# Patient Record
Sex: FEMALE | Race: WHITE | HISPANIC OR LATINO
[De-identification: names, ages, dates, MRNs, and addresses within clinical notes are randomized per-mention and may not be internally consistent; named-entity substitution may affect disease eponyms.]

---

## 2017-03-06 ENCOUNTER — RESULT REVIEW (OUTPATIENT)
Age: 42
End: 2017-03-06

## 2017-08-18 ENCOUNTER — RESULT REVIEW (OUTPATIENT)
Age: 42
End: 2017-08-18

## 2017-09-14 ENCOUNTER — APPOINTMENT (OUTPATIENT)
Dept: OBGYN | Facility: CLINIC | Age: 42
End: 2017-09-14
Payer: COMMERCIAL

## 2017-09-14 VITALS
DIASTOLIC BLOOD PRESSURE: 70 MMHG | TEMPERATURE: 98 F | WEIGHT: 205 LBS | BODY MASS INDEX: 36.32 KG/M2 | HEIGHT: 63 IN | SYSTOLIC BLOOD PRESSURE: 120 MMHG

## 2017-09-14 VITALS
SYSTOLIC BLOOD PRESSURE: 132 MMHG | TEMPERATURE: 98.6 F | WEIGHT: 175 LBS | BODY MASS INDEX: 31.01 KG/M2 | HEIGHT: 63 IN | RESPIRATION RATE: 16 BRPM | DIASTOLIC BLOOD PRESSURE: 82 MMHG

## 2017-09-14 DIAGNOSIS — N63 UNSPECIFIED LUMP IN BREAST: ICD-10-CM

## 2017-09-14 LAB
BILIRUB UR QL STRIP: NORMAL
BILIRUB UR QL STRIP: NORMAL
CLARITY UR: CLEAR
CLARITY UR: CLEAR
COLLECTION METHOD: NORMAL
COLLECTION METHOD: NORMAL
GLUCOSE UR-MCNC: NORMAL
GLUCOSE UR-MCNC: NORMAL
HCG UR QL: 0.2 EU/DL
HCG UR QL: 0.2 EU/DL
HGB UR QL STRIP.AUTO: NORMAL
HGB UR QL STRIP.AUTO: NORMAL
KETONES UR-MCNC: NORMAL
KETONES UR-MCNC: NORMAL
LEUKOCYTE ESTERASE UR QL STRIP: NORMAL
LEUKOCYTE ESTERASE UR QL STRIP: NORMAL
NITRITE UR QL STRIP: NORMAL
NITRITE UR QL STRIP: NORMAL
PH UR STRIP: 6.5
PH UR STRIP: 7
PROT UR STRIP-MCNC: NORMAL
PROT UR STRIP-MCNC: NORMAL
SP GR UR STRIP: 1.02
SP GR UR STRIP: 1.02

## 2017-09-14 PROCEDURE — 99396 PREV VISIT EST AGE 40-64: CPT

## 2017-09-14 PROCEDURE — 81003 URINALYSIS AUTO W/O SCOPE: CPT | Mod: QW

## 2017-09-18 ENCOUNTER — OTHER (OUTPATIENT)
Age: 42
End: 2017-09-18

## 2017-09-21 ENCOUNTER — RESULT REVIEW (OUTPATIENT)
Age: 42
End: 2017-09-21

## 2017-09-24 LAB — CYTOLOGY CVX/VAG DOC THIN PREP: NORMAL

## 2017-09-27 ENCOUNTER — APPOINTMENT (OUTPATIENT)
Dept: ULTRASOUND IMAGING | Facility: CLINIC | Age: 42
End: 2017-09-27
Payer: COMMERCIAL

## 2017-09-27 ENCOUNTER — APPOINTMENT (OUTPATIENT)
Dept: MAMMOGRAPHY | Facility: CLINIC | Age: 42
End: 2017-09-27
Payer: COMMERCIAL

## 2017-09-27 ENCOUNTER — OUTPATIENT (OUTPATIENT)
Dept: OUTPATIENT SERVICES | Facility: HOSPITAL | Age: 42
LOS: 1 days | End: 2017-09-27
Payer: COMMERCIAL

## 2017-09-27 DIAGNOSIS — Z00.8 ENCOUNTER FOR OTHER GENERAL EXAMINATION: ICD-10-CM

## 2017-09-27 PROCEDURE — G0204: CPT | Mod: 26

## 2017-09-27 PROCEDURE — G0279: CPT | Mod: 26

## 2017-09-27 PROCEDURE — 76642 ULTRASOUND BREAST LIMITED: CPT

## 2017-09-27 PROCEDURE — 76642 ULTRASOUND BREAST LIMITED: CPT | Mod: 26,50

## 2017-09-27 PROCEDURE — 77066 DX MAMMO INCL CAD BI: CPT

## 2017-09-27 PROCEDURE — G0279: CPT

## 2017-10-26 ENCOUNTER — APPOINTMENT (OUTPATIENT)
Dept: SURGERY | Facility: CLINIC | Age: 42
End: 2017-10-26
Payer: COMMERCIAL

## 2017-10-26 ENCOUNTER — LABORATORY RESULT (OUTPATIENT)
Age: 42
End: 2017-10-26

## 2017-10-26 PROCEDURE — 36415 COLL VENOUS BLD VENIPUNCTURE: CPT

## 2017-10-26 PROCEDURE — 99213 OFFICE O/P EST LOW 20 MIN: CPT | Mod: 25

## 2017-10-27 LAB
T3 SERPL-MCNC: 96 NG/DL
T4 FREE SERPL-MCNC: 1.7 NG/DL
THYROGLOB AB SERPL-ACNC: <20 IU/ML
THYROGLOB SERPL-MCNC: <0.2 NG/ML
TSH SERPL-ACNC: 0.05 UIU/ML

## 2017-11-02 ENCOUNTER — OTHER (OUTPATIENT)
Age: 42
End: 2017-11-02

## 2017-12-21 ENCOUNTER — APPOINTMENT (OUTPATIENT)
Dept: OBGYN | Facility: CLINIC | Age: 42
End: 2017-12-21

## 2018-05-02 ENCOUNTER — MEDICATION RENEWAL (OUTPATIENT)
Age: 43
End: 2018-05-02

## 2018-05-21 ENCOUNTER — APPOINTMENT (OUTPATIENT)
Dept: OBGYN | Facility: CLINIC | Age: 43
End: 2018-05-21
Payer: COMMERCIAL

## 2018-05-21 VITALS
BODY MASS INDEX: 31.71 KG/M2 | SYSTOLIC BLOOD PRESSURE: 128 MMHG | DIASTOLIC BLOOD PRESSURE: 78 MMHG | HEIGHT: 63 IN | TEMPERATURE: 98.7 F | RESPIRATION RATE: 16 BRPM | WEIGHT: 179 LBS

## 2018-05-21 DIAGNOSIS — B00.1 HERPESVIRAL VESICULAR DERMATITIS: ICD-10-CM

## 2018-05-21 PROCEDURE — 99214 OFFICE O/P EST MOD 30 MIN: CPT

## 2018-09-17 ENCOUNTER — APPOINTMENT (OUTPATIENT)
Dept: OBGYN | Facility: CLINIC | Age: 43
End: 2018-09-17
Payer: COMMERCIAL

## 2018-09-17 VITALS
WEIGHT: 178 LBS | TEMPERATURE: 98.8 F | OXYGEN SATURATION: 98 % | BODY MASS INDEX: 31.54 KG/M2 | HEART RATE: 80 BPM | HEIGHT: 63 IN | RESPIRATION RATE: 16 BRPM | DIASTOLIC BLOOD PRESSURE: 60 MMHG | SYSTOLIC BLOOD PRESSURE: 112 MMHG

## 2018-09-17 LAB — GLUCOSE BLDC GLUCOMTR-MCNC: 12.3

## 2018-09-17 PROCEDURE — 82270 OCCULT BLOOD FECES: CPT

## 2018-09-17 PROCEDURE — 99396 PREV VISIT EST AGE 40-64: CPT

## 2018-09-20 LAB — CYTOLOGY CVX/VAG DOC THIN PREP: NORMAL

## 2018-10-23 ENCOUNTER — LABORATORY RESULT (OUTPATIENT)
Age: 43
End: 2018-10-23

## 2018-10-23 ENCOUNTER — APPOINTMENT (OUTPATIENT)
Dept: SURGERY | Facility: CLINIC | Age: 43
End: 2018-10-23
Payer: COMMERCIAL

## 2018-10-23 PROCEDURE — 99213 OFFICE O/P EST LOW 20 MIN: CPT

## 2018-10-23 PROCEDURE — 36415 COLL VENOUS BLD VENIPUNCTURE: CPT

## 2018-10-24 LAB
T3 SERPL-MCNC: 84 NG/DL
T4 FREE SERPL-MCNC: 1.3 NG/DL
THYROGLOB AB SERPL-ACNC: <20 IU/ML
THYROGLOB SERPL-MCNC: <0.2 NG/ML
TSH SERPL-ACNC: 0.8 UIU/ML

## 2018-10-25 ENCOUNTER — RESULT REVIEW (OUTPATIENT)
Age: 43
End: 2018-10-25

## 2019-07-18 ENCOUNTER — APPOINTMENT (OUTPATIENT)
Dept: OBGYN | Facility: CLINIC | Age: 44
End: 2019-07-18

## 2019-08-30 ENCOUNTER — EMERGENCY (EMERGENCY)
Facility: HOSPITAL | Age: 44
LOS: 0 days | Discharge: HOME | End: 2019-08-31
Attending: FAMILY MEDICINE
Payer: COMMERCIAL

## 2019-08-30 VITALS
SYSTOLIC BLOOD PRESSURE: 154 MMHG | RESPIRATION RATE: 18 BRPM | HEART RATE: 100 BPM | WEIGHT: 169.98 LBS | HEIGHT: 62 IN | OXYGEN SATURATION: 96 % | DIASTOLIC BLOOD PRESSURE: 90 MMHG | TEMPERATURE: 102 F

## 2019-08-30 DIAGNOSIS — R73.03 PREDIABETES: ICD-10-CM

## 2019-08-30 DIAGNOSIS — N28.89 OTHER SPECIFIED DISORDERS OF KIDNEY AND URETER: ICD-10-CM

## 2019-08-30 DIAGNOSIS — R10.31 RIGHT LOWER QUADRANT PAIN: ICD-10-CM

## 2019-08-30 PROCEDURE — 83605 ASSAY OF LACTIC ACID: CPT

## 2019-08-30 PROCEDURE — 96375 TX/PRO/DX INJ NEW DRUG ADDON: CPT | Mod: 59

## 2019-08-30 PROCEDURE — 99284 EMERGENCY DEPT VISIT MOD MDM: CPT | Mod: 25

## 2019-08-30 PROCEDURE — 93005 ELECTROCARDIOGRAM TRACING: CPT

## 2019-08-30 PROCEDURE — 80053 COMPREHEN METABOLIC PANEL: CPT

## 2019-08-30 PROCEDURE — 81001 URINALYSIS AUTO W/SCOPE: CPT

## 2019-08-30 PROCEDURE — 85610 PROTHROMBIN TIME: CPT

## 2019-08-30 PROCEDURE — 87186 SC STD MICRODIL/AGAR DIL: CPT

## 2019-08-30 PROCEDURE — 85730 THROMBOPLASTIN TIME PARTIAL: CPT

## 2019-08-30 PROCEDURE — 99284 EMERGENCY DEPT VISIT MOD MDM: CPT

## 2019-08-30 PROCEDURE — 87040 BLOOD CULTURE FOR BACTERIA: CPT

## 2019-08-30 PROCEDURE — 36415 COLL VENOUS BLD VENIPUNCTURE: CPT

## 2019-08-30 PROCEDURE — 85025 COMPLETE CBC W/AUTO DIFF WBC: CPT

## 2019-08-30 PROCEDURE — 96365 THER/PROPH/DIAG IV INF INIT: CPT

## 2019-08-30 PROCEDURE — 74177 CT ABD & PELVIS W/CONTRAST: CPT

## 2019-08-30 PROCEDURE — 87086 URINE CULTURE/COLONY COUNT: CPT

## 2019-08-30 NOTE — ED ADULT NURSE NOTE - OBJECTIVE STATEMENT
Pt presented to ED c/o sharp intermittent right abd pain radiating to right flank, headache, neck pain, fever, chills. Pt took Tylenol PTA. Refuses rectal temp at this time. Denies chest pain, N/V/D, SOB, urinary symptoms at this time. Does not want pain medication at this time. Upon arrival sepsis protocol initiated. Maintained on tele monitor and spO2 monitoring. 12 kisha ekg done. Safety/fall precautions. Hx of endometriosis, thyroid cancer on Synthroid.

## 2019-08-31 ENCOUNTER — INPATIENT (INPATIENT)
Facility: HOSPITAL | Age: 44
LOS: 2 days | Discharge: HOME | DRG: 690 | End: 2019-09-03
Attending: INTERNAL MEDICINE | Admitting: FAMILY MEDICINE
Payer: COMMERCIAL

## 2019-08-31 VITALS
SYSTOLIC BLOOD PRESSURE: 150 MMHG | OXYGEN SATURATION: 95 % | DIASTOLIC BLOOD PRESSURE: 72 MMHG | HEART RATE: 70 BPM | RESPIRATION RATE: 18 BRPM

## 2019-08-31 VITALS — WEIGHT: 169.98 LBS | HEIGHT: 63 IN

## 2019-08-31 DIAGNOSIS — N12 TUBULO-INTERSTITIAL NEPHRITIS, NOT SPECIFIED AS ACUTE OR CHRONIC: ICD-10-CM

## 2019-08-31 LAB
ALBUMIN SERPL ELPH-MCNC: 3.7 G/DL — SIGNIFICANT CHANGE UP (ref 3.3–5)
ALBUMIN SERPL ELPH-MCNC: 4.2 G/DL — SIGNIFICANT CHANGE UP (ref 3.3–5)
ALP SERPL-CCNC: 58 U/L — SIGNIFICANT CHANGE UP (ref 40–120)
ALP SERPL-CCNC: 66 U/L — SIGNIFICANT CHANGE UP (ref 40–120)
ALT FLD-CCNC: 18 U/L — SIGNIFICANT CHANGE UP (ref 12–78)
ALT FLD-CCNC: 34 U/L — SIGNIFICANT CHANGE UP (ref 12–78)
ANION GAP SERPL CALC-SCNC: 8 MMOL/L — SIGNIFICANT CHANGE UP (ref 5–17)
ANION GAP SERPL CALC-SCNC: 8 MMOL/L — SIGNIFICANT CHANGE UP (ref 5–17)
APPEARANCE UR: CLEAR — SIGNIFICANT CHANGE UP
APPEARANCE UR: CLEAR — SIGNIFICANT CHANGE UP
APTT BLD: 29.8 SEC — SIGNIFICANT CHANGE UP (ref 27.5–36.3)
APTT BLD: 30 SEC — SIGNIFICANT CHANGE UP (ref 27.5–36.3)
AST SERPL-CCNC: 18 U/L — SIGNIFICANT CHANGE UP (ref 15–37)
AST SERPL-CCNC: 47 U/L — HIGH (ref 15–37)
BASOPHILS # BLD AUTO: 0.01 K/UL — SIGNIFICANT CHANGE UP (ref 0–0.2)
BASOPHILS # BLD AUTO: 0.01 K/UL — SIGNIFICANT CHANGE UP (ref 0–0.2)
BASOPHILS NFR BLD AUTO: 0.1 % — SIGNIFICANT CHANGE UP (ref 0–2)
BASOPHILS NFR BLD AUTO: 0.1 % — SIGNIFICANT CHANGE UP (ref 0–2)
BILIRUB SERPL-MCNC: 0.5 MG/DL — SIGNIFICANT CHANGE UP (ref 0.2–1.2)
BILIRUB SERPL-MCNC: 0.5 MG/DL — SIGNIFICANT CHANGE UP (ref 0.2–1.2)
BILIRUB UR-MCNC: NEGATIVE — SIGNIFICANT CHANGE UP
BILIRUB UR-MCNC: NEGATIVE — SIGNIFICANT CHANGE UP
BUN SERPL-MCNC: 13 MG/DL — SIGNIFICANT CHANGE UP (ref 7–23)
BUN SERPL-MCNC: 23 MG/DL — SIGNIFICANT CHANGE UP (ref 7–23)
CALCIUM SERPL-MCNC: 8.4 MG/DL — LOW (ref 8.5–10.1)
CALCIUM SERPL-MCNC: 9.3 MG/DL — SIGNIFICANT CHANGE UP (ref 8.5–10.1)
CHLORIDE SERPL-SCNC: 103 MMOL/L — SIGNIFICANT CHANGE UP (ref 96–108)
CHLORIDE SERPL-SCNC: 104 MMOL/L — SIGNIFICANT CHANGE UP (ref 96–108)
CO2 SERPL-SCNC: 26 MMOL/L — SIGNIFICANT CHANGE UP (ref 22–31)
CO2 SERPL-SCNC: 27 MMOL/L — SIGNIFICANT CHANGE UP (ref 22–31)
COLOR SPEC: YELLOW — SIGNIFICANT CHANGE UP
COLOR SPEC: YELLOW — SIGNIFICANT CHANGE UP
CREAT SERPL-MCNC: 0.93 MG/DL — SIGNIFICANT CHANGE UP (ref 0.5–1.3)
CREAT SERPL-MCNC: 0.98 MG/DL — SIGNIFICANT CHANGE UP (ref 0.5–1.3)
DIFF PNL FLD: ABNORMAL
DIFF PNL FLD: ABNORMAL
EOSINOPHIL # BLD AUTO: 0.01 K/UL — SIGNIFICANT CHANGE UP (ref 0–0.5)
EOSINOPHIL # BLD AUTO: 0.01 K/UL — SIGNIFICANT CHANGE UP (ref 0–0.5)
EOSINOPHIL NFR BLD AUTO: 0.1 % — SIGNIFICANT CHANGE UP (ref 0–6)
EOSINOPHIL NFR BLD AUTO: 0.1 % — SIGNIFICANT CHANGE UP (ref 0–6)
GLUCOSE SERPL-MCNC: 132 MG/DL — HIGH (ref 70–99)
GLUCOSE SERPL-MCNC: 145 MG/DL — HIGH (ref 70–99)
GLUCOSE UR QL: NEGATIVE MG/DL — SIGNIFICANT CHANGE UP
GLUCOSE UR QL: NEGATIVE MG/DL — SIGNIFICANT CHANGE UP
HCT VFR BLD CALC: 29.4 % — LOW (ref 34.5–45)
HCT VFR BLD CALC: 31.7 % — LOW (ref 34.5–45)
HGB BLD-MCNC: 10.4 G/DL — LOW (ref 11.5–15.5)
HGB BLD-MCNC: 9.7 G/DL — LOW (ref 11.5–15.5)
IMM GRANULOCYTES NFR BLD AUTO: 0.4 % — SIGNIFICANT CHANGE UP (ref 0–1.5)
IMM GRANULOCYTES NFR BLD AUTO: 0.5 % — SIGNIFICANT CHANGE UP (ref 0–1.5)
INR BLD: 1.03 RATIO — SIGNIFICANT CHANGE UP (ref 0.88–1.16)
INR BLD: 1.09 RATIO — SIGNIFICANT CHANGE UP (ref 0.88–1.16)
KETONES UR-MCNC: NEGATIVE — SIGNIFICANT CHANGE UP
KETONES UR-MCNC: NEGATIVE — SIGNIFICANT CHANGE UP
LACTATE SERPL-SCNC: 0.6 MMOL/L — LOW (ref 0.7–2)
LACTATE SERPL-SCNC: 0.7 MMOL/L — SIGNIFICANT CHANGE UP (ref 0.7–2)
LEUKOCYTE ESTERASE UR-ACNC: ABNORMAL
LEUKOCYTE ESTERASE UR-ACNC: NEGATIVE — SIGNIFICANT CHANGE UP
LYMPHOCYTES # BLD AUTO: 1.41 K/UL — SIGNIFICANT CHANGE UP (ref 1–3.3)
LYMPHOCYTES # BLD AUTO: 1.51 K/UL — SIGNIFICANT CHANGE UP (ref 1–3.3)
LYMPHOCYTES # BLD AUTO: 13 % — SIGNIFICANT CHANGE UP (ref 13–44)
LYMPHOCYTES # BLD AUTO: 13.1 % — SIGNIFICANT CHANGE UP (ref 13–44)
MCHC RBC-ENTMCNC: 29.1 PG — SIGNIFICANT CHANGE UP (ref 27–34)
MCHC RBC-ENTMCNC: 29.6 PG — SIGNIFICANT CHANGE UP (ref 27–34)
MCHC RBC-ENTMCNC: 32.8 GM/DL — SIGNIFICANT CHANGE UP (ref 32–36)
MCHC RBC-ENTMCNC: 33 GM/DL — SIGNIFICANT CHANGE UP (ref 32–36)
MCV RBC AUTO: 88.8 FL — SIGNIFICANT CHANGE UP (ref 80–100)
MCV RBC AUTO: 89.6 FL — SIGNIFICANT CHANGE UP (ref 80–100)
MONOCYTES # BLD AUTO: 1.05 K/UL — HIGH (ref 0–0.9)
MONOCYTES # BLD AUTO: 1.16 K/UL — HIGH (ref 0–0.9)
MONOCYTES NFR BLD AUTO: 10 % — SIGNIFICANT CHANGE UP (ref 2–14)
MONOCYTES NFR BLD AUTO: 9.7 % — SIGNIFICANT CHANGE UP (ref 2–14)
NEUTROPHILS # BLD AUTO: 8.27 K/UL — HIGH (ref 1.8–7.4)
NEUTROPHILS # BLD AUTO: 8.9 K/UL — HIGH (ref 1.8–7.4)
NEUTROPHILS NFR BLD AUTO: 76.4 % — SIGNIFICANT CHANGE UP (ref 43–77)
NEUTROPHILS NFR BLD AUTO: 76.5 % — SIGNIFICANT CHANGE UP (ref 43–77)
NITRITE UR-MCNC: NEGATIVE — SIGNIFICANT CHANGE UP
NITRITE UR-MCNC: POSITIVE
PH UR: 5 — SIGNIFICANT CHANGE UP (ref 5–8)
PH UR: 5 — SIGNIFICANT CHANGE UP (ref 5–8)
PLATELET # BLD AUTO: 212 K/UL — SIGNIFICANT CHANGE UP (ref 150–400)
PLATELET # BLD AUTO: 213 K/UL — SIGNIFICANT CHANGE UP (ref 150–400)
POTASSIUM SERPL-MCNC: 3.4 MMOL/L — LOW (ref 3.5–5.3)
POTASSIUM SERPL-MCNC: 3.4 MMOL/L — LOW (ref 3.5–5.3)
POTASSIUM SERPL-SCNC: 3.4 MMOL/L — LOW (ref 3.5–5.3)
POTASSIUM SERPL-SCNC: 3.4 MMOL/L — LOW (ref 3.5–5.3)
PROT SERPL-MCNC: 7.4 GM/DL — SIGNIFICANT CHANGE UP (ref 6–8.3)
PROT SERPL-MCNC: 7.8 GM/DL — SIGNIFICANT CHANGE UP (ref 6–8.3)
PROT UR-MCNC: 30 MG/DL
PROT UR-MCNC: NEGATIVE MG/DL — SIGNIFICANT CHANGE UP
PROTHROM AB SERPL-ACNC: 11.5 SEC — SIGNIFICANT CHANGE UP (ref 10–12.9)
PROTHROM AB SERPL-ACNC: 12.1 SEC — SIGNIFICANT CHANGE UP (ref 10–12.9)
RBC # BLD: 3.28 M/UL — LOW (ref 3.8–5.2)
RBC # BLD: 3.57 M/UL — LOW (ref 3.8–5.2)
RBC # FLD: 12.2 % — SIGNIFICANT CHANGE UP (ref 10.3–14.5)
RBC # FLD: 12.2 % — SIGNIFICANT CHANGE UP (ref 10.3–14.5)
SODIUM SERPL-SCNC: 137 MMOL/L — SIGNIFICANT CHANGE UP (ref 135–145)
SODIUM SERPL-SCNC: 139 MMOL/L — SIGNIFICANT CHANGE UP (ref 135–145)
SP GR SPEC: 1 — LOW (ref 1.01–1.02)
SP GR SPEC: 1.01 — SIGNIFICANT CHANGE UP (ref 1.01–1.02)
UROBILINOGEN FLD QL: NEGATIVE MG/DL — SIGNIFICANT CHANGE UP
UROBILINOGEN FLD QL: NEGATIVE MG/DL — SIGNIFICANT CHANGE UP
WBC # BLD: 10.8 K/UL — HIGH (ref 3.8–10.5)
WBC # BLD: 11.64 K/UL — HIGH (ref 3.8–10.5)
WBC # FLD AUTO: 10.8 K/UL — HIGH (ref 3.8–10.5)
WBC # FLD AUTO: 11.64 K/UL — HIGH (ref 3.8–10.5)

## 2019-08-31 PROCEDURE — 71045 X-RAY EXAM CHEST 1 VIEW: CPT | Mod: 26

## 2019-08-31 PROCEDURE — 74177 CT ABD & PELVIS W/CONTRAST: CPT | Mod: 26

## 2019-08-31 PROCEDURE — 80048 BASIC METABOLIC PNL TOTAL CA: CPT

## 2019-08-31 PROCEDURE — 93010 ELECTROCARDIOGRAM REPORT: CPT

## 2019-08-31 PROCEDURE — 71045 X-RAY EXAM CHEST 1 VIEW: CPT

## 2019-08-31 PROCEDURE — 36415 COLL VENOUS BLD VENIPUNCTURE: CPT

## 2019-08-31 PROCEDURE — 85025 COMPLETE CBC W/AUTO DIFF WBC: CPT

## 2019-08-31 RX ORDER — ACETAMINOPHEN 500 MG
650 TABLET ORAL ONCE
Refills: 0 | Status: COMPLETED | OUTPATIENT
Start: 2019-08-31 | End: 2019-08-31

## 2019-08-31 RX ORDER — SODIUM CHLORIDE 9 MG/ML
2500 INJECTION INTRAMUSCULAR; INTRAVENOUS; SUBCUTANEOUS ONCE
Refills: 0 | Status: COMPLETED | OUTPATIENT
Start: 2019-08-31 | End: 2019-08-31

## 2019-08-31 RX ORDER — KETOROLAC TROMETHAMINE 30 MG/ML
30 SYRINGE (ML) INJECTION ONCE
Refills: 0 | Status: DISCONTINUED | OUTPATIENT
Start: 2019-08-31 | End: 2019-08-31

## 2019-08-31 RX ORDER — PIPERACILLIN AND TAZOBACTAM 4; .5 G/20ML; G/20ML
3.38 INJECTION, POWDER, LYOPHILIZED, FOR SOLUTION INTRAVENOUS ONCE
Refills: 0 | Status: COMPLETED | OUTPATIENT
Start: 2019-08-31 | End: 2019-08-31

## 2019-08-31 RX ORDER — LEVOTHYROXINE SODIUM 125 MCG
100 TABLET ORAL DAILY
Refills: 0 | Status: DISCONTINUED | OUTPATIENT
Start: 2019-08-31 | End: 2019-09-03

## 2019-08-31 RX ORDER — ONDANSETRON 8 MG/1
4 TABLET, FILM COATED ORAL EVERY 6 HOURS
Refills: 0 | Status: DISCONTINUED | OUTPATIENT
Start: 2019-08-31 | End: 2019-09-01

## 2019-08-31 RX ORDER — ONDANSETRON 8 MG/1
4 TABLET, FILM COATED ORAL ONCE
Refills: 0 | Status: COMPLETED | OUTPATIENT
Start: 2019-08-31 | End: 2019-08-31

## 2019-08-31 RX ORDER — ACETAMINOPHEN 500 MG
500 TABLET ORAL ONCE
Refills: 0 | Status: COMPLETED | OUTPATIENT
Start: 2019-08-31 | End: 2019-08-31

## 2019-08-31 RX ORDER — CEFTRIAXONE 500 MG/1
1000 INJECTION, POWDER, FOR SOLUTION INTRAMUSCULAR; INTRAVENOUS EVERY 24 HOURS
Refills: 0 | Status: DISCONTINUED | OUTPATIENT
Start: 2019-08-31 | End: 2019-08-31

## 2019-08-31 RX ORDER — CEPHALEXIN 500 MG
1 CAPSULE ORAL
Qty: 42 | Refills: 0
Start: 2019-08-31 | End: 2019-09-13

## 2019-08-31 RX ORDER — SODIUM CHLORIDE 9 MG/ML
2400 INJECTION, SOLUTION INTRAVENOUS ONCE
Refills: 0 | Status: COMPLETED | OUTPATIENT
Start: 2019-08-31 | End: 2019-08-31

## 2019-08-31 RX ORDER — DEXTROSE MONOHYDRATE, SODIUM CHLORIDE, AND POTASSIUM CHLORIDE 50; .745; 4.5 G/1000ML; G/1000ML; G/1000ML
1000 INJECTION, SOLUTION INTRAVENOUS
Refills: 0 | Status: DISCONTINUED | OUTPATIENT
Start: 2019-08-31 | End: 2019-09-02

## 2019-08-31 RX ORDER — CEFTRIAXONE 500 MG/1
1000 INJECTION, POWDER, FOR SOLUTION INTRAMUSCULAR; INTRAVENOUS ONCE
Refills: 0 | Status: COMPLETED | OUTPATIENT
Start: 2019-08-31 | End: 2019-08-31

## 2019-08-31 RX ORDER — ACETAMINOPHEN 500 MG
975 TABLET ORAL ONCE
Refills: 0 | Status: COMPLETED | OUTPATIENT
Start: 2019-08-31 | End: 2019-08-31

## 2019-08-31 RX ORDER — CEFTRIAXONE 500 MG/1
1000 INJECTION, POWDER, FOR SOLUTION INTRAMUSCULAR; INTRAVENOUS EVERY 24 HOURS
Refills: 0 | Status: COMPLETED | OUTPATIENT
Start: 2019-08-31 | End: 2019-09-02

## 2019-08-31 RX ADMIN — Medication 500 MILLIGRAM(S): at 01:45

## 2019-08-31 RX ADMIN — Medication 30 MILLIGRAM(S): at 01:48

## 2019-08-31 RX ADMIN — Medication 500 MILLIGRAM(S): at 00:41

## 2019-08-31 RX ADMIN — SODIUM CHLORIDE 2400 MILLILITER(S): 9 INJECTION, SOLUTION INTRAVENOUS at 00:34

## 2019-08-31 RX ADMIN — DEXTROSE MONOHYDRATE, SODIUM CHLORIDE, AND POTASSIUM CHLORIDE 100 MILLILITER(S): 50; .745; 4.5 INJECTION, SOLUTION INTRAVENOUS at 22:39

## 2019-08-31 RX ADMIN — Medication 650 MILLIGRAM(S): at 23:10

## 2019-08-31 RX ADMIN — ONDANSETRON 4 MILLIGRAM(S): 8 TABLET, FILM COATED ORAL at 19:07

## 2019-08-31 RX ADMIN — Medication 650 MILLIGRAM(S): at 19:07

## 2019-08-31 RX ADMIN — CEFTRIAXONE 1000 MILLIGRAM(S): 500 INJECTION, POWDER, FOR SOLUTION INTRAMUSCULAR; INTRAVENOUS at 19:08

## 2019-08-31 RX ADMIN — Medication 30 MILLIGRAM(S): at 02:03

## 2019-08-31 RX ADMIN — Medication 650 MILLIGRAM(S): at 22:40

## 2019-08-31 RX ADMIN — SODIUM CHLORIDE 2500 MILLILITER(S): 9 INJECTION INTRAMUSCULAR; INTRAVENOUS; SUBCUTANEOUS at 20:08

## 2019-08-31 RX ADMIN — PIPERACILLIN AND TAZOBACTAM 200 GRAM(S): 4; .5 INJECTION, POWDER, LYOPHILIZED, FOR SOLUTION INTRAVENOUS at 00:34

## 2019-08-31 RX ADMIN — PIPERACILLIN AND TAZOBACTAM 3.38 GRAM(S): 4; .5 INJECTION, POWDER, LYOPHILIZED, FOR SOLUTION INTRAVENOUS at 01:04

## 2019-08-31 RX ADMIN — SODIUM CHLORIDE 2400 MILLILITER(S): 9 INJECTION, SOLUTION INTRAVENOUS at 01:34

## 2019-08-31 RX ADMIN — SODIUM CHLORIDE 833.33 MILLILITER(S): 9 INJECTION INTRAMUSCULAR; INTRAVENOUS; SUBCUTANEOUS at 19:07

## 2019-08-31 NOTE — ED PROVIDER NOTE - OBJECTIVE STATEMENT
Pt is a 43 yo wf with hx htn, prediabetes who noted stabbing, poking right flank pain radiating anteriorly since today. Had nausea no vomiting. Pt developed fever to 102 at home. No dysuria. No diarrhea. No recent travel. Nonsmoker nondrinker no drugs. Took tylenol prior to arrival. Has minimal pain at present . Pain is intermittent.

## 2019-08-31 NOTE — ED PROVIDER NOTE - CLINICAL SUMMARY MEDICAL DECISION MAKING FREE TEXT BOX
Pt with hx ht, prediabetes, here with abd pain to back and fever. Labs, ct u/a pain meds/antipyretic.

## 2019-08-31 NOTE — ED STATDOCS - CLINICAL SUMMARY MEDICAL DECISION MAKING FREE TEXT BOX
Pt returned for continued symptoms related to pyelonephritis.  IV fluids given, IV antibiotics, admit to medicine.  Discussed with Dr. Brown.

## 2019-08-31 NOTE — H&P ADULT - HISTORY OF PRESENT ILLNESS
HPI: The patient is a 43 yo female who came to the ER for LLQ abdominal pain, vomiting, fever 102. CT abd: Mild right perinephric fluid and right urothelial enhancement without obstructing stone. Findings may be related to recent passage of a stone.She was d/c home on Keflex.  After she took 2 doses of keflex she developed fever, chills, nausea, RLQ pain and returned to the ED.     PMHx: Thyroid CA s/p surgery, HYpothyroidism, HTN, endometriosis, heart murmur due to rheumatic fever,     PSHx: Thyroidectomy, Partial hysterectomy , oophorectomy, umbilical hernia repair.     Family Hx: Mother has Hx. of DM, HTN, father has no medical problesm.     Social Hx.: not smoking, no alcohol use    ROS: as in HPI.   Eyes: no changes in vision    ENT/Mouth: no changes    Cardiovascular: no chest pain    Respiratory: no SOB    Gastrointestinal: nausea,   Genitourinary: RLQ pain,     Breast: no pain    Musculoskeletal: no pain    Integumentary: no itching    Neurological: No Headache, no tremor,    Psychiatric: no suicidal ideations    Endocrine: no excessive thirst,     Hematologic/Lymphatic: no swollen glands    Allergic/Immunologic: no itching      Physical Exam: Vital Signs Last 24 Hrs  T(C): 37.8 (31 Aug 2019 17:42), Max: 38.7 (30 Aug 2019 23:47)  T(F): 100.1 (31 Aug 2019 17:42), Max: 101.6 (30 Aug 2019 23:47)  HR: 91 (31 Aug 2019 17:42) (70 - 100)  BP: 144/75 (31 Aug 2019 17:42) (144/75 - 158/88)  BP(mean): --  RR: 16 (31 Aug 2019 17:42) (16 - 18)  SpO2: 99% (31 Aug 2019 17:42) (95% - 99%)        HEENT: PRRL EOMI    MOUTH/TEETH/GUMS: Clear    NECK: no JVD    LUNGS: Clear    HEART: S1,S2 RR    ABDOMEN: soft nontender    EXTREMITIES:  no pedal edema    MUSCULOSKELETAL: no joint swelling     NEURO: no tremor, no focal signs.    SKIN: no rash    : CVA negative,     Lab:                       9.7    10.80 )-----------( 213      ( 31 Aug 2019 18:47 )             29.4       08-31    139  |  104  |  13  ----------------------------<  132<H>  3.4<L>   |  27  |  0.98    Ca    8.4<L>      31 Aug 2019 18:47    TPro  7.4  /  Alb  3.7  /  TBili  0.5  /  DBili  x   /  AST  47<H>  /  ALT  34  /  AlkPhos  66  08-31    Mild right perinephric fluid and right urothelial enhancement without   obstructing stone. Findings may be related to recent passage of a stone.   Correlate clinically for right ureteritis. No hydronephrosis.  Normal appendix. HPI: The patient is a 43 yo female who came to the ER for LLQ abdominal pain, vomiting, fever 102. CT abd: Mild right perinephric fluid and right urothelial enhancement without obstructing stone. Findings may be related to recent passage of a stone.She was d/c home on Keflex.  After she took 2 doses of keflex she developed fever, chills, nausea, RLQ pain and returned to the ED.     PMHx: Thyroid CA s/p surgery, HYpothyroidism, HTN, endometriosis, heart murmur due to rheumatic fever,     PSHx: Thyroidectomy, Partial hysterectomy , oophorectomy, umbilical hernia repair.     Family Hx: Mother has Hx. of DM, HTN, father has no medical problesm.     Social Hx.: not smoking, no alcohol use    ROS: as in HPI.   Eyes: no changes in vision    ENT/Mouth: no changes    Cardiovascular: no chest pain    Respiratory: no SOB    Gastrointestinal: nausea,   Genitourinary: RLQ pain,     Breast: no pain    Musculoskeletal: no pain    Integumentary: no itching    Neurological: No Headache, no tremor,    Psychiatric: no suicidal ideations    Endocrine: no excessive thirst,     Hematologic/Lymphatic: no swollen glands    Allergic/Immunologic: no itching      Physical Exam: Vital Signs Last 24 Hrs  T(C): 37.8 (31 Aug 2019 17:42), Max: 38.7 (30 Aug 2019 23:47)  T(F): 100.1 (31 Aug 2019 17:42), Max: 101.6 (30 Aug 2019 23:47)  HR: 91 (31 Aug 2019 17:42) (70 - 100)  BP: 144/75 (31 Aug 2019 17:42) (144/75 - 158/88)  BP(mean): --  RR: 16 (31 Aug 2019 17:42) (16 - 18)  SpO2: 99% (31 Aug 2019 17:42) (95% - 99%)        HEENT: PRRL EOMI    MOUTH/TEETH/GUMS: Clear    NECK: no JVD    LUNGS: Clear    HEART: S1,S2 RR    ABDOMEN: mild RLQ tenderness, no rebound.     EXTREMITIES:  no pedal edema    MUSCULOSKELETAL: no joint swelling     NEURO: no tremor, no focal signs.    SKIN: no rash    : CVA negative,     Lab:                       9.7    10.80 )-----------( 213      ( 31 Aug 2019 18:47 )             29.4       08-31    139  |  104  |  13  ----------------------------<  132<H>  3.4<L>   |  27  |  0.98    Ca    8.4<L>      31 Aug 2019 18:47    TPro  7.4  /  Alb  3.7  /  TBili  0.5  /  DBili  x   /  AST  47<H>  /  ALT  34  /  AlkPhos  66  08-31    Mild right perinephric fluid and right urothelial enhancement without   obstructing stone. Findings may be related to recent passage of a stone.   Correlate clinically for right ureteritis. No hydronephrosis.  Normal appendix.

## 2019-08-31 NOTE — H&P ADULT - ASSESSMENT
45 yo female who came to the ER for LLQ abdominal pain, vomiting, fever 102. CT abd: Mild right perinephric fluid and right urothelial enhancement without obstructing stone. Findings may be related to recent passage of a stone.She was d/c home on Keflex.  After she took 2 doses of keflex she developed fever, chills, nausea, RLQ pain and returned to the ED.   assessment Dx:                       1. UTI                       2. Uretritis                        3.Nausea/ vomiting                       4. Hypothyroidism                       5. HTN                        6. Hypokalemia.     Plan:    admit to : medicine                medications IV Ceftriaxone, IVF, Repeat K                VTEP: venodyne                Labs: cbc, cmp               Radiology: CT abd.               DNR status : full code.               Consults: ID

## 2019-08-31 NOTE — ED STATDOCS - OBJECTIVE STATEMENT
45 y/o female with a PMHx of heart murmur, hypothyroidism, HTN, endometriosis, s/p umbilical hernia repair, D&C presents to the ED c/o fever, nausea and abd pain with known UTI and right uteritis despite taking PO Keflex. Pt notes she has taken 2 doses of Keflex. Currently c/o recurrent fever, intractable nausea and abd pain. PCP: Ospiva. 43 y/o female with a PMHx of heart murmur, hypothyroidism, HTN, endometriosis, s/p umbilical hernia repair presents to the ED c/o fever, nausea and abd pain with known UTI and right uteritis following evaluation at [facility] yesterday. Pt notes she has taken 2 doses of Keflex. Currently c/o recurrent fever, intractable nausea and abd pain. PCP: Cristy. 45 y/o female with a PMHx of heart murmur, hypothyroidism, HTN, endometriosis, s/p umbilical hernia repair presents to the ED c/o fever, nausea and abd pain with known UTI and right uteritis despite taking PO Keflex since evaluation at [facility] yesterday. Pt notes she has taken 2 doses of Keflex. Currently c/o recurrent fever, intractable nausea and abd pain. PCP: Osmiltonva. 45 y/o female with a PMHx of anemia, heart murmur due to rheumatic fever, malignant neoplasm of thyroid gland, hypothyroidism, HTN, endometriosis s/p D&C, s/p right oophorectomy, s/p umbilical hernia repair presents to the ED c/o fever, nausea and abd pain with known UTI and right uteritis despite taking PO Keflex since evaluation at [facility] yesterday. Pt notes she has taken 2 doses of Keflex. Currently c/o recurrent fever, intractable nausea and abd pain. PCP: Cristy.

## 2019-08-31 NOTE — ED PROVIDER NOTE - PATIENT PORTAL LINK FT
You can access the FollowMyHealth Patient Portal offered by [facility] by registering at the following website: [email or web address]. By joining [employer]’s FollowMyHealth portal, you will also be able to view your health information using other applications (apps) compatible with our system.

## 2019-08-31 NOTE — ED PROVIDER NOTE - PMH
Anemia    Endometriosis, uterus    Heart murmur  due to rheumatic fever  History of hypothyroidism    HTN - Hypertension  was on Diovan for 2 months  Malignant neoplasm of thyroid gland

## 2019-08-31 NOTE — ED ADULT NURSE NOTE - OBJECTIVE STATEMENT
URINARY INFECTION CURRENTLY ON ABX BUT STILL HAS FEVER. max temp 102, took 400mgs Motrin 1hr PTA  Complaints of lower abdomen pain, nausea. discharged home with keflex, took 2 doses and still not feeling better

## 2019-08-31 NOTE — ED PROVIDER NOTE - NSFOLLOWUPINSTRUCTIONS_ED_ALL_ED_FT
Take Keflex 500mg three times daily. Drink lots of fluids. Take motrin or tylenol for discomfort. Follow up withyour urologist or Dr. Calvert. Return to ER if worse.

## 2019-08-31 NOTE — ED PROVIDER NOTE - CARE PROVIDER_API CALL
Shahzad Calvert)  Urology  [address] [facility] [address] [facility] [address], [address] [place] [address]  Phone: [phone]  Fax: [phone]  Follow Up Time:

## 2019-08-31 NOTE — ED STATDOCS - PSH
History of dilatation and curettage  for endometrosis    History of umbilical hernia repair  pt states hernia has retured  R Kidney Stent Placed then removed  due to kidney infection as per pt  S/P right oophorectomy  May 2011 (due to endometriosis)

## 2019-08-31 NOTE — ED STATDOCS - PROGRESS NOTE DETAILS
43 y/o female with a PMHx of heart murmur, hypothyroidism, HTN, endometriosis, s/p umbilical hernia repair, D&C presents to the ED c/o fever, nausea and abd pain with known UTI and right uteritis despite taking PO Keflex. Pt notes she has taken 2 doses of Keflex. Currently c/o recurrent fever, intractable nausea and abd pain. PCP: Cristy.   Karma Farrell PA-C To be admitted for fever, pyelonephritis.  Karma Farrell PA-C

## 2019-08-31 NOTE — ED PROVIDER NOTE - PROGRESS NOTE DETAILS
43 y/o F presents with abd pain x1 day. Pt reports intermittent R flank pain associated with n/v today. Reports Tmax of 102 at home. Denies CP, SOB, urinary sx, diarrhea or other complaints at this time.   Abd: soft, ND, NTTP. +RCVAT. no r/g/r. -Cornelio Lake PA-C Results given to patient and spouse.She understands need for f/u and when to rter if worse.  Lonny FLETCHER

## 2019-09-01 LAB
ANION GAP SERPL CALC-SCNC: 9 MMOL/L — SIGNIFICANT CHANGE UP (ref 5–17)
BASOPHILS # BLD AUTO: 0.01 K/UL — SIGNIFICANT CHANGE UP (ref 0–0.2)
BASOPHILS NFR BLD AUTO: 0.1 % — SIGNIFICANT CHANGE UP (ref 0–2)
BUN SERPL-MCNC: 11 MG/DL — SIGNIFICANT CHANGE UP (ref 7–23)
CALCIUM SERPL-MCNC: 8 MG/DL — LOW (ref 8.5–10.1)
CHLORIDE SERPL-SCNC: 110 MMOL/L — HIGH (ref 96–108)
CO2 SERPL-SCNC: 24 MMOL/L — SIGNIFICANT CHANGE UP (ref 22–31)
CREAT SERPL-MCNC: 0.79 MG/DL — SIGNIFICANT CHANGE UP (ref 0.5–1.3)
CULTURE RESULTS: NO GROWTH — SIGNIFICANT CHANGE UP
EOSINOPHIL # BLD AUTO: 0.04 K/UL — SIGNIFICANT CHANGE UP (ref 0–0.5)
EOSINOPHIL NFR BLD AUTO: 0.4 % — SIGNIFICANT CHANGE UP (ref 0–6)
GLUCOSE SERPL-MCNC: 115 MG/DL — HIGH (ref 70–99)
HCT VFR BLD CALC: 30.1 % — LOW (ref 34.5–45)
HGB BLD-MCNC: 9.7 G/DL — LOW (ref 11.5–15.5)
IMM GRANULOCYTES NFR BLD AUTO: 0.5 % — SIGNIFICANT CHANGE UP (ref 0–1.5)
LYMPHOCYTES # BLD AUTO: 1.77 K/UL — SIGNIFICANT CHANGE UP (ref 1–3.3)
LYMPHOCYTES # BLD AUTO: 18.5 % — SIGNIFICANT CHANGE UP (ref 13–44)
MCHC RBC-ENTMCNC: 29.2 PG — SIGNIFICANT CHANGE UP (ref 27–34)
MCHC RBC-ENTMCNC: 32.2 GM/DL — SIGNIFICANT CHANGE UP (ref 32–36)
MCV RBC AUTO: 90.7 FL — SIGNIFICANT CHANGE UP (ref 80–100)
MONOCYTES # BLD AUTO: 1.09 K/UL — HIGH (ref 0–0.9)
MONOCYTES NFR BLD AUTO: 11.4 % — SIGNIFICANT CHANGE UP (ref 2–14)
NEUTROPHILS # BLD AUTO: 6.62 K/UL — SIGNIFICANT CHANGE UP (ref 1.8–7.4)
NEUTROPHILS NFR BLD AUTO: 69.1 % — SIGNIFICANT CHANGE UP (ref 43–77)
PLATELET # BLD AUTO: 195 K/UL — SIGNIFICANT CHANGE UP (ref 150–400)
POTASSIUM SERPL-MCNC: 4.1 MMOL/L — SIGNIFICANT CHANGE UP (ref 3.5–5.3)
POTASSIUM SERPL-SCNC: 4.1 MMOL/L — SIGNIFICANT CHANGE UP (ref 3.5–5.3)
RBC # BLD: 3.32 M/UL — LOW (ref 3.8–5.2)
RBC # FLD: 12.3 % — SIGNIFICANT CHANGE UP (ref 10.3–14.5)
SODIUM SERPL-SCNC: 143 MMOL/L — SIGNIFICANT CHANGE UP (ref 135–145)
SPECIMEN SOURCE: SIGNIFICANT CHANGE UP
WBC # BLD: 9.58 K/UL — SIGNIFICANT CHANGE UP (ref 3.8–10.5)
WBC # FLD AUTO: 9.58 K/UL — SIGNIFICANT CHANGE UP (ref 3.8–10.5)

## 2019-09-01 RX ORDER — IBUPROFEN 200 MG
600 TABLET ORAL ONCE
Refills: 0 | Status: COMPLETED | OUTPATIENT
Start: 2019-09-01 | End: 2019-09-01

## 2019-09-01 RX ORDER — LISINOPRIL 2.5 MG/1
10 TABLET ORAL DAILY
Refills: 0 | Status: DISCONTINUED | OUTPATIENT
Start: 2019-09-01 | End: 2019-09-03

## 2019-09-01 RX ORDER — ACETAMINOPHEN 500 MG
650 TABLET ORAL ONCE
Refills: 0 | Status: COMPLETED | OUTPATIENT
Start: 2019-09-01 | End: 2019-09-01

## 2019-09-01 RX ORDER — ONDANSETRON 8 MG/1
4 TABLET, FILM COATED ORAL EVERY 6 HOURS
Refills: 0 | Status: DISCONTINUED | OUTPATIENT
Start: 2019-09-01 | End: 2019-09-03

## 2019-09-01 RX ADMIN — Medication 600 MILLIGRAM(S): at 08:50

## 2019-09-01 RX ADMIN — Medication 600 MILLIGRAM(S): at 09:16

## 2019-09-01 RX ADMIN — Medication 100 MICROGRAM(S): at 15:59

## 2019-09-01 RX ADMIN — DEXTROSE MONOHYDRATE, SODIUM CHLORIDE, AND POTASSIUM CHLORIDE 100 MILLILITER(S): 50; .745; 4.5 INJECTION, SOLUTION INTRAVENOUS at 08:49

## 2019-09-01 RX ADMIN — DEXTROSE MONOHYDRATE, SODIUM CHLORIDE, AND POTASSIUM CHLORIDE 100 MILLILITER(S): 50; .745; 4.5 INJECTION, SOLUTION INTRAVENOUS at 20:03

## 2019-09-01 RX ADMIN — ONDANSETRON 4 MILLIGRAM(S): 8 TABLET, FILM COATED ORAL at 19:01

## 2019-09-01 RX ADMIN — LISINOPRIL 10 MILLIGRAM(S): 2.5 TABLET ORAL at 17:06

## 2019-09-01 RX ADMIN — Medication 1 TABLET(S): at 13:59

## 2019-09-01 RX ADMIN — Medication 650 MILLIGRAM(S): at 23:20

## 2019-09-01 RX ADMIN — Medication 1 TABLET(S): at 14:45

## 2019-09-01 RX ADMIN — Medication 650 MILLIGRAM(S): at 22:50

## 2019-09-01 RX ADMIN — CEFTRIAXONE 1000 MILLIGRAM(S): 500 INJECTION, POWDER, FOR SOLUTION INTRAMUSCULAR; INTRAVENOUS at 17:07

## 2019-09-01 NOTE — CONSULT NOTE ADULT - ASSESSMENT
The patient is a 43 yo female with past medical history hypothyroidism, thyroid cancer, s/p surgery, hypertension, endometrosis, s/p hysterectomy admitted on 8/31 for evaluation of right lower quadrant pain and associated fever to 102; initially seen the day before given oral antibiotics but did not improve and returned for further evaluation. Notes nausea, chills and fever. No other specific complaints.    1. Patient admitted with right sided pyelonephritis, possibly passed kidney stone  - follow up cultures   - iv hydration and supportive care   - serial cbc and monitor temperature   - reviewed prior medical records to evaluate for resistant or atypical pathogens   - agree with ceftriaxone as ordered  2. other issues: per medicine

## 2019-09-01 NOTE — PROGRESS NOTE ADULT - SUBJECTIVE AND OBJECTIVE BOX
cc: abd pain, n/v, fever  hpi: 44y female p/w llq pain, n/v, fever 102.  CT +pyelonephritis and pt d/c home from ed on keflex but couldn't tolerate and returned to ED.  Pt    ros-    Vital Signs Last 24 Hrs  T(C): 37.5 (01 Sep 2019 05:38), Max: 37.8 (31 Aug 2019 17:42)  T(F): 99.5 (01 Sep 2019 05:38), Max: 100.1 (31 Aug 2019 17:42)  HR: 65 (01 Sep 2019 05:38) (65 - 91)  BP: 102/54 (01 Sep 2019 05:38) (102/54 - 144/75)  BP(mean): --  RR: 18 (31 Aug 2019 20:58) (16 - 18)  SpO2: 98% (01 Sep 2019 05:38) (96% - 99%)    physical            LABS: All Labs Reviewed:                        9.7    10.80 )-----------( 213      ( 31 Aug 2019 18:47 )             29.4     08-31    139  |  104  |  13  ----------------------------<  132<H>  3.4<L>   |  27  |  0.98    Ca    8.4<L>      31 Aug 2019 18:47    TPro  7.4  /  Alb  3.7  /  TBili  0.5  /  DBili  x   /  AST  47<H>  /  ALT  34  /  AlkPhos  66  08-31    PT/INR - ( 31 Aug 2019 18:47 )   PT: 12.1 sec;   INR: 1.09 ratio       PTT - ( 31 Aug 2019 18:47 )  PTT:30.0 sec        < from: CT Abdomen and Pelvis w/ IV Cont (08.31.19 @ 02:35) >    IMPRESSION:    Mild right perinephric fluid and right urothelial enhancement without   obstructing stone. Findings may be related to recent passage of a stone.   Correlate clinically for right ureteritis. No hydronephrosis.  Normal appendix.      < end of copied text >        MEDICATIONS  (STANDING):  cefTRIAXone Injectable. 1000 milliGRAM(s) IV Push every 24 hours  levothyroxine 100 MICROGram(s) Oral daily  sodium chloride 0.9% with potassium chloride 20 mEq/L 1000 milliLiter(s) (100 mL/Hr) IV Continuous <Continuous>    MEDICATIONS  (PRN):  ondansetron    Tablet 4 milliGRAM(s) Oral every 6 hours PRN Nausea and/or Vomiting      ASSESSMENt and PLAN:    44y female w/     1. UTI, pyelonephritis  - IV rocephin  - cultures pending       2. cc: abd pain, n/v, fever  hpi: 44y female p/w abd pain, n/v, fever 102.  CT +pyelonephritis and pt d/c home from ed on keflex but couldn't tolerate and returned to ED.  Pt feeling better today but still w/ intermittent RLQ abd pain, nonradiating. No flank pain, no n/v/d, no f/u/d , no hematuria.  tmax 100.1    ros- as per hpi, other 10 point ros negative     Vital Signs Last 24 Hrs  T(C): 37.5 (01 Sep 2019 05:38), Max: 37.8 (31 Aug 2019 17:42)  T(F): 99.5 (01 Sep 2019 05:38), Max: 100.1 (31 Aug 2019 17:42)  HR: 65 (01 Sep 2019 05:38) (65 - 91)  BP: 102/54 (01 Sep 2019 05:38) (102/54 - 144/75)  BP(mean): --  RR: 18 (31 Aug 2019 20:58) (16 - 18)  SpO2: 98% (01 Sep 2019 05:38) (96% - 99%)      PHYSICAL EXAM:  General: NAD, comfortable- family present  Neuro: AAOx3  HEENT: NCAT,   Neck: Soft and supple  Respiratory: CTA b/l, no w/r/r  Cardiovascular: S1 and S2, RRR  Gastrointestinal: +BS, soft, minimal rlq ttp but no rebound tenderness or guarding  : no suprapubic ttp or flank ttp  Extremities: No edema  Vascular: 2+ peripheral pulses        LABS: All Labs Reviewed:                        9.7    10.80 )-----------( 213      ( 31 Aug 2019 18:47 )             29.4     08-31    139  |  104  |  13  ----------------------------<  132<H>  3.4<L>   |  27  |  0.98    Ca    8.4<L>      31 Aug 2019 18:47    TPro  7.4  /  Alb  3.7  /  TBili  0.5  /  DBili  x   /  AST  47<H>  /  ALT  34  /  AlkPhos  66  08-31    PT/INR - ( 31 Aug 2019 18:47 )   PT: 12.1 sec;   INR: 1.09 ratio       PTT - ( 31 Aug 2019 18:47 )  PTT:30.0 sec        < from: CT Abdomen and Pelvis w/ IV Cont (08.31.19 @ 02:35) >    IMPRESSION:    Mild right perinephric fluid and right urothelial enhancement without   obstructing stone. Findings may be related to recent passage of a stone.   Correlate clinically for right ureteritis. No hydronephrosis.  Normal appendix.      < end of copied text >        MEDICATIONS  (STANDING):  cefTRIAXone Injectable. 1000 milliGRAM(s) IV Push every 24 hours  levothyroxine 100 MICROGram(s) Oral daily  sodium chloride 0.9% with potassium chloride 20 mEq/L 1000 milliLiter(s) (100 mL/Hr) IV Continuous <Continuous>    MEDICATIONS  (PRN):  ondansetron    Tablet 4 milliGRAM(s) Oral every 6 hours PRN Nausea and/or Vomiting      ASSESSMENt and PLAN:    44y female w/     1. UTI, pyelonephritis  - IV rocephin  - cultures pending       2. hypokalemia  replete    dvt px  ambulate

## 2019-09-01 NOTE — CONSULT NOTE ADULT - SUBJECTIVE AND OBJECTIVE BOX
Patient is a 44y old  Female who presents with a chief complaint of Pyelonephritis (01 Sep 2019 08:14)    HPI:  HPI: The patient is a 43 yo female with past medical history hypothyroidism, thyroid cancer, s/p surgery, hypertension, endometrosis, s/p hysterectomy admitted on  for evaluation of right lower quadrant pain and associated fever to 102; initially seen the day before given oral antibiotics but did not improve and returned for further evaluation. Notes nausea, chills and fever. No other specific complaints.          PMH: as above  PSH: as above  Meds: per reconciliation sheet, noted below  MEDICATIONS  (STANDING):  cefTRIAXone Injectable. 1000 milliGRAM(s) IV Push every 24 hours  levothyroxine 100 MICROGram(s) Oral daily  sodium chloride 0.9% with potassium chloride 20 mEq/L 1000 milliLiter(s) (100 mL/Hr) IV Continuous <Continuous>    MEDICATIONS  (PRN):  ondansetron    Tablet 4 milliGRAM(s) Oral every 6 hours PRN Nausea and/or Vomiting    Allergies    No Known Drug Allergies  peanuts (Other (Moderate))  Sesame (Other (Moderate))    Intolerances      Social: no smoking, no alcohol, no illegal drugs; no recent travel, no exposure to TB  FAMILY HISTORY:     no history of premature cardiovascular disease in first degree relatives  ROS: the patient has no HA, no dizziness, no sore throat, no blurry vision, no CP, no palpitations, no SOB, no cough, no diarrhea, no N/V, no dysuria, no leg pain, no claudication, no rash, no joint aches, no rectal pain or bleeding, no night sweats  All other systems reviewed and are negative    Vital Signs Last 24 Hrs  T(C): 37.7 (01 Sep 2019 11:07), Max: 37.8 (31 Aug 2019 17:42)  T(F): 99.8 (01 Sep 2019 11:07), Max: 100.1 (31 Aug 2019 17:42)  HR: 67 (01 Sep 2019 11:07) (65 - 91)  BP: 126/67 (01 Sep 2019 11:07) (102/54 - 144/75)  BP(mean): --  RR: 18 (01 Sep 2019 11:07) (16 - 18)  SpO2: 99% (01 Sep 2019 11:07) (96% - 99%)  Daily Height in cm: 160.02 (31 Aug 2019 17:35)    Daily     PE:    Constitutional: frail looking  HEENT: NC/AT, EOMI, PERRLA, conjunctivae clear; ears and nose atraumatic; pharynx clear  Neck: supple; thyroid not palpable  Back: no tenderness  Respiratory: respiratory effort normal; clear to auscultation  Cardiovascular: S1S2 regular, no murmurs  Abdomen: soft, right lower quadrant  tender, not distended, positive BS; no liver or spleen organomegaly  Genitourinary: no suprapubic tenderness  Musculoskeletal: no muscle tenderness, no joint swelling or tenderness  Neurological/ Psychiatric: AxOx3, judgement and insight normal;  moving all extremities  Skin: no rashes; no palpable lesions    Labs: all available labs reviewed                        9.7    9.58  )-----------( 195      ( 01 Sep 2019 08:15 )             30.1     09-    143  |  110<H>  |  11  ----------------------------<  115<H>  4.1   |  24  |  0.79    Ca    8.0<L>      01 Sep 2019 08:15    TPro  7.4  /  Alb  3.7  /  TBili  0.5  /  DBili  x   /  AST  47<H>  /  ALT  34  /  AlkPhos  66       LIVER FUNCTIONS - ( 31 Aug 2019 18:47 )  Alb: 3.7 g/dL / Pro: 7.4 gm/dL / ALK PHOS: 66 U/L / ALT: 34 U/L / AST: 47 U/L / GGT: x           Urinalysis Basic - ( 31 Aug 2019 18:47 )    Color: Yellow / Appearance: Clear / S.005 / pH: x  Gluc: x / Ketone: Negative  / Bili: Negative / Urobili: Negative mg/dL   Blood: x / Protein: Negative mg/dL / Nitrite: Negative   Leuk Esterase: Negative / RBC: 3-5 /HPF / WBC 3-5   Sq Epi: x / Non Sq Epi: Occasional / Bacteria: Occasional    < from: CT Abdomen and Pelvis w/ IV Cont (19 @ 02:35) >    EXAM:  CT ABDOMEN AND PELVIS IC                            PROCEDURE DATE:  2019          INTERPRETATION:  Abdominal/Pelvic CT    2019 2:38 AM    Indication: Right-sided abdominal pain    Technique: Axial images were obtained following IV contrast from the lung   bases through pubic symphysis.  90 cc of Omnipaque 350 was administered   intravenously without complication and 10 cc was discarded.  Reformatted   coronal and sagittal images are submitted.    Comparison: None    FINDINGS:    LUNG BASES:  There are no pleural effusions. Dependent atelectatic   changes at the lung bases.  PERITONEUM:  There is no free air or focal collection.  No free fluid.  LIVER: A couple of cysts centimeter lucencies in the lateral segment of   the left lobe.  SPLEEN: Normal.  GALLBLADDER: Unremarkable.  BILIARY TREE: Unremarkable.  PANCREAS: Normal.  ADRENAL GLANDS: Normal.  KIDNEYS: No hydronephrosis. Mild right perinephric fluid and right   urothelial thickening without obstructing stone. Findings suggest right   ureteritis.  BOWEL: The stomach is incompletely distended.  There is no small bowel   obstruction, focal bowel wall thickening or diverticulitis. The appendix   is unremarkable.    URINARY BLADDER: Incompletely distended.  PELVICORGANS: No pelvic masses.    There is no significant adenopathy.  VASCULATURE: Unremarkable.  RETROPERITONEUM:  There is no mass.  BONES: Unremarkable.  ABDOMINAL WALL: Fat-containing umbilical hernia.    IMPRESSION:    Mild right perinephric fluid and right urothelial enhancement without   obstructing stone. Findings may be related to recent passage of a stone.   Correlate clinically for right ureteritis. No hydronephrosis.  Normal appendix.    < end of copied text >        Radiology: all available radiological tests reviewed    Advanced directives addressed: full resuscitation

## 2019-09-02 RX ORDER — ACETAMINOPHEN 500 MG
650 TABLET ORAL EVERY 6 HOURS
Refills: 0 | Status: DISCONTINUED | OUTPATIENT
Start: 2019-09-02 | End: 2019-09-03

## 2019-09-02 RX ADMIN — LISINOPRIL 10 MILLIGRAM(S): 2.5 TABLET ORAL at 05:14

## 2019-09-02 RX ADMIN — Medication 100 MICROGRAM(S): at 05:14

## 2019-09-02 RX ADMIN — Medication 650 MILLIGRAM(S): at 23:16

## 2019-09-02 RX ADMIN — Medication 650 MILLIGRAM(S): at 11:15

## 2019-09-02 RX ADMIN — Medication 650 MILLIGRAM(S): at 22:46

## 2019-09-02 RX ADMIN — DEXTROSE MONOHYDRATE, SODIUM CHLORIDE, AND POTASSIUM CHLORIDE 100 MILLILITER(S): 50; .745; 4.5 INJECTION, SOLUTION INTRAVENOUS at 05:37

## 2019-09-02 RX ADMIN — Medication 650 MILLIGRAM(S): at 16:24

## 2019-09-02 RX ADMIN — CEFTRIAXONE 1000 MILLIGRAM(S): 500 INJECTION, POWDER, FOR SOLUTION INTRAMUSCULAR; INTRAVENOUS at 16:24

## 2019-09-02 RX ADMIN — Medication 650 MILLIGRAM(S): at 11:45

## 2019-09-02 RX ADMIN — Medication 650 MILLIGRAM(S): at 17:36

## 2019-09-02 NOTE — CDI QUERY NOTE - NSCDIOTHERTXTBX_GEN_ALL_CORE_HH
Documentation on chart of UTI and Pyelonephritis.    Calcium levels 9.3 and dropping to 8.4 and 8.0    Please clarify a diagnosis.  A) Hypocalcemia  B) No indications of Hypocalcemia  C) Unknown  D) Other ( Please specify condition)

## 2019-09-02 NOTE — PROGRESS NOTE ADULT - ASSESSMENT
The patient is a 43 yo female with past medical history hypothyroidism, thyroid cancer, s/p surgery, hypertension, endometrosis, s/p hysterectomy admitted on 8/31 for evaluation of right lower quadrant pain and associated fever to 102; initially seen the day before given oral antibiotics but did not improve and returned for further evaluation. Notes nausea, chills and fever. No other specific complaints.    1. Patient admitted with right sided pyelonephritis, possibly passed kidney stone  - follow up cultures   - iv hydration and supportive care   - serial cbc and monitor temperature   - reviewed prior medical records to evaluate for resistant or atypical pathogens   - day #2 ceftriaxone  - tolerating antibiotics without rashes or side effects   - E coli is sensitive to ceftriaxone; most likely will be able to discharge on ceftin 500 mg po q 12 hours for 7 more days  2. other issues: per medicine

## 2019-09-02 NOTE — PROGRESS NOTE ADULT - SUBJECTIVE AND OBJECTIVE BOX
cc: abd pain, n/v, fever  hpi: 44y female p/w abd pain, n/v, fever 102.  CT +pyelonephritis and pt d/c home from ed on keflex but couldn't tolerate and returned to ED.  Pt feeling better today but still w/ intermittent RLQ abd pain, nonradiating. No flank pain, no n/v/d, no f/u/d , no hematuria.  tmax 100.1  9/3- intermittent rlq pain.  No n/v/d, no flank pain, no f/u/d.   tmax 100.5    ros- as per hpi, other 10 point ros negative       Vital Signs Last 24 Hrs  T(C): 37.6 (02 Sep 2019 12:14), Max: 38.1 (02 Sep 2019 11:09)  T(F): 99.7 (02 Sep 2019 12:14), Max: 100.5 (02 Sep 2019 11:09)  HR: 68 (02 Sep 2019 11:09) (63 - 73)  BP: 131/72 (02 Sep 2019 11:09) (126/70 - 143/71)  BP(mean): --  RR: 18 (02 Sep 2019 11:09) (18 - 18)  SpO2: 98% (02 Sep 2019 11:09) (98% - 99%)  T(C): 37.5 (01 Sep 2019 05:38), Max: 37.8 (31 Aug 2019 17:42)      PHYSICAL EXAM:  General: NAD, comfortable- ambulating in room  Neuro: AAOx3  HEENT: NCAT,   Neck: Soft and supple  Respiratory: CTA b/l, no w/r/r  Cardiovascular: S1 and S2, RRR  Gastrointestinal: +BS, soft, minimal rlq ttp but no rebound tenderness or guarding  : no suprapubic ttp or flank ttp  Extremities: No edema  Vascular: 2+ peripheral pulses            LABS: All Labs Reviewed:                        9.7    9.58  )-----------( 195      ( 01 Sep 2019 08:15 )             30.1     09-01    143  |  110<H>  |  11  ----------------------------<  115<H>  4.1   |  24  |  0.79    Ca    8.0<L>      01 Sep 2019 08:15    TPro  7.4  /  Alb  3.7  /  TBili  0.5  /  DBili  x   /  AST  47<H>  /  ALT  34  /  AlkPhos  66  08-31    PT/INR - ( 31 Aug 2019 18:47 )   PT: 12.1 sec;   INR: 1.09 ratio         PTT - ( 31 Aug 2019 18:47 )  PTT:30.0 sec            < from: CT Abdomen and Pelvis w/ IV Cont (08.31.19 @ 02:35) >    IMPRESSION:    Mild right perinephric fluid and right urothelial enhancement without   obstructing stone. Findings may be related to recent passage of a stone.   Correlate clinically for right ureteritis. No hydronephrosis.  Normal appendix.      < end of copied text >        MEDICATIONS  (STANDING):  cefTRIAXone Injectable. 1000 milliGRAM(s) IV Push every 24 hours  levothyroxine 100 MICROGram(s) Oral daily  sodium chloride 0.9% with potassium chloride 20 mEq/L 1000 milliLiter(s) (100 mL/Hr) IV Continuous <Continuous>    MEDICATIONS  (PRN):  ondansetron    Tablet 4 milliGRAM(s) Oral every 6 hours PRN Nausea and/or Vomiting      ASSESSMENt and PLAN:    44y female w/     1. UTI, pyelonephritis  - urine cx GNR , pending sensitivities  - blood cultures no growth  - continue iv rocephin day 2   - d/c ivf        2. hypokalemia  repleted    dvt px  ambulate    dispo- d/c planning on po antibiotics once sensitivites result and fevers subside.

## 2019-09-02 NOTE — PROGRESS NOTE ADULT - SUBJECTIVE AND OBJECTIVE BOX
Patient is a 44y old  Female who presents with a chief complaint of Pyelonephritis (01 Sep 2019 08:14)      Date of service: 09-02-19 @ 15:16    Patient ambulating; had lower abdominal pain earlier  Low grade fevers      ROS: no fever or chills; denies dizziness, no HA, no SOB or cough,  no diarrhea or constipation; no dysuria, no urinary frequency, no legs pain, no rashes    MEDICATIONS  (STANDING):  cefTRIAXone Injectable. 1000 milliGRAM(s) IV Push every 24 hours  levothyroxine 100 MICROGram(s) Oral daily  lisinopril 10 milliGRAM(s) Oral daily    MEDICATIONS  (PRN):  acetaminophen   Tablet .. 650 milliGRAM(s) Oral every 6 hours PRN Temp greater or equal to 38C (100.4F)  acetaminophen 325 mG/butalbital 50 mG/caffeine 40 mG 1 Tablet(s) Oral every 8 hours PRN migraine headache  ondansetron   Disintegrating Tablet 4 milliGRAM(s) Oral every 6 hours PRN Nausea and/or Vomiting      Vital Signs Last 24 Hrs  T(C): 37.6 (02 Sep 2019 12:14), Max: 38.1 (02 Sep 2019 11:09)  T(F): 99.7 (02 Sep 2019 12:14), Max: 100.5 (02 Sep 2019 11:09)  HR: 68 (02 Sep 2019 11:09) (63 - 73)  BP: 131/72 (02 Sep 2019 11:09) (126/70 - 143/71)  BP(mean): --  RR: 18 (02 Sep 2019 11:09) (18 - 18)  SpO2: 98% (02 Sep 2019 11:09) (98% - 99%)    Physical Exam:        Constitutional: frail looking  HEENT: NC/AT, EOMI, PERRLA, conjunctivae clear; ears and nose atraumatic; pharynx clear  Neck: supple; thyroid not palpable  Back: no tenderness  Respiratory: respiratory effort normal; clear to auscultation  Cardiovascular: S1S2 regular, no murmurs  Abdomen: soft, right lower quadrant  tender, not distended, positive BS; no liver or spleen organomegaly  Genitourinary: no suprapubic tenderness  Musculoskeletal: no muscle tenderness, no joint swelling or tenderness  Neurological/ Psychiatric: AxOx3, judgement and insight normal;  moving all extremities  Skin: no rashes; no palpable lesions    Labs: all available labs reviewed                         Labs:                        9.7    9.58  )-----------( 195      ( 01 Sep 2019 08:15 )             30.1     09-01    143  |  110<H>  |  11  ----------------------------<  115<H>  4.1   |  24  |  0.79    Ca    8.0<L>      01 Sep 2019 08:15    TPro  7.4  /  Alb  3.7  /  TBili  0.5  /  DBili  x   /  AST  47<H>  /  ALT  34  /  AlkPhos  66  08-31           Cultures:       Culture - Urine (collected 08-31-19 @ 18:47)  Source: .Urine None  Final Report (09-01-19 @ 19:04):    No growth    Culture - Blood (collected 08-31-19 @ 18:47)  Source: .Blood None  Preliminary Report (09-02-19 @ 01:02):    No growth to date.    Culture - Blood (collected 08-31-19 @ 18:47)  Source: .Blood None  Preliminary Report (09-02-19 @ 01:02):    No growth to date.    Culture - Urine (collected 08-31-19 @ 01:40)  Source: .Urine None  Final Report (09-02-19 @ 10:24):    50,000 - 99,000 CFU/mL Escherichia coli  Organism: Escherichia coli (09-02-19 @ 10:24)  Organism: Escherichia coli (09-02-19 @ 10:24)      -  Amikacin: S <=16      -  Ampicillin: S <=8 These ampicillin results predict results for amoxicillin      -  Ampicillin/Sulbactam: S <=8/4 Enterobacter, Citrobacter, and Serratia may develop resistance during prolonged therapy (3-4 days)      -  Aztreonam: S <=4      -  Cefazolin: S <=8 (MIC_CL_COM_ENTERIC_CEFAZU) For uncomplicated UTI with K. pneumoniae, E. coli, or P. mirablis: ALEXIS <=16 is sensitive and ALEXIS >=32 is resistant. This also predicts results for oral agents cefaclor, cefdinir, cefpodoxime, cefprozil, cefuroxime axetil, cephalexin and locarbef for uncomplicated UTI. Note that some isolates may be susceptible to these agents while testing resistant to cefazolin.      -  Cefepime: S <=4      -  Cefoxitin: S <=8      -  Ceftriaxone: S <=1 Enterobacter, Citrobacter, and Serratia may develop resistance during prolonged therapy      -  Ciprofloxacin: S <=1      -  Gentamicin: S <=4      -  Imipenem: S <=1      -  Levofloxacin: S <=2      -  Meropenem: S <=1      -  Nitrofurantoin: S <=32 Should not be used to treat pyelonephritis      -  Piperacillin/Tazobactam: S <=16      -  Tigecycline: S <=2      -  Tobramycin: S <=4      -  Trimethoprim/Sulfamethoxazole: S <=2/38      Method Type: ALEXIS    Culture - Blood (collected 08-31-19 @ 00:05)  Source: .Blood Blood-Peripheral  Preliminary Report (09-01-19 @ 09:02):    No growth to date.    Culture - Blood (collected 08-31-19 @ 00:05)  Source: .Blood Blood-Peripheral  Preliminary Report (09-01-19 @ 09:02):    No growth to date.          < from: CT Abdomen and Pelvis w/ IV Cont (08.31.19 @ 02:35) >    EXAM:  CT ABDOMEN AND PELVIS IC                            PROCEDURE DATE:  08/31/2019          INTERPRETATION:  Abdominal/Pelvic CT    8/31/2019 2:38 AM    Indication: Right-sided abdominal pain    Technique: Axial images were obtained following IV contrast from the lung   bases through pubic symphysis.  90 cc of Omnipaque 350 was administered   intravenously without complication and 10 cc was discarded.  Reformatted   coronal and sagittal images are submitted.    Comparison: None    FINDINGS:    LUNG BASES:  There are no pleural effusions. Dependent atelectatic   changes at the lung bases.  PERITONEUM:  There is no free air or focal collection.  No free fluid.  LIVER: A couple of cysts centimeter lucencies in the lateral segment of   the left lobe.  SPLEEN: Normal.  GALLBLADDER: Unremarkable.  BILIARY TREE: Unremarkable.  PANCREAS: Normal.  ADRENAL GLANDS: Normal.  KIDNEYS: No hydronephrosis. Mild right perinephric fluid and right   urothelial thickening without obstructing stone. Findings suggest right   ureteritis.  BOWEL: The stomach is incompletely distended.  There is no small bowel   obstruction, focal bowel wall thickening or diverticulitis. The appendix   is unremarkable.    URINARY BLADDER: Incompletely distended.  PELVICORGANS: No pelvic masses.    There is no significant adenopathy.  VASCULATURE: Unremarkable.  RETROPERITONEUM:  There is no mass.  BONES: Unremarkable.  ABDOMINAL WALL: Fat-containing umbilical hernia.    IMPRESSION:    Mild right perinephric fluid and right urothelial enhancement without   obstructing stone. Findings may be related to recent passage of a stone.   Correlate clinically for right ureteritis. No hydronephrosis.  Normal appendix.    < end of copied text >        Radiology: all available radiological tests reviewed    Advanced directives addressed: full resuscitation

## 2019-09-03 ENCOUNTER — TRANSCRIPTION ENCOUNTER (OUTPATIENT)
Age: 44
End: 2019-09-03

## 2019-09-03 VITALS
OXYGEN SATURATION: 97 % | SYSTOLIC BLOOD PRESSURE: 136 MMHG | RESPIRATION RATE: 18 BRPM | DIASTOLIC BLOOD PRESSURE: 75 MMHG | TEMPERATURE: 98 F | HEART RATE: 64 BPM

## 2019-09-03 RX ORDER — CEFUROXIME AXETIL 250 MG
1 TABLET ORAL
Qty: 14 | Refills: 0
Start: 2019-09-03 | End: 2019-09-09

## 2019-09-03 RX ADMIN — Medication 100 MICROGRAM(S): at 06:05

## 2019-09-03 RX ADMIN — Medication 1 TABLET(S): at 09:25

## 2019-09-03 RX ADMIN — Medication 650 MILLIGRAM(S): at 12:44

## 2019-09-03 RX ADMIN — Medication 1 TABLET(S): at 08:25

## 2019-09-03 RX ADMIN — LISINOPRIL 10 MILLIGRAM(S): 2.5 TABLET ORAL at 06:05

## 2019-09-03 NOTE — PROGRESS NOTE ADULT - ASSESSMENT
The patient is a 43 yo female with past medical history hypothyroidism, thyroid cancer, s/p surgery, hypertension, endometrosis, s/p hysterectomy admitted on 8/31 for evaluation of right lower quadrant pain and associated fever to 102; initially seen the day before given oral antibiotics but did not improve and returned for further evaluation. Notes nausea, chills and fever. No other specific complaints.    1. Patient admitted with right sided pyelonephritis, possibly passed kidney stone  - follow up cultures   - iv hydration and supportive care   - serial cbc and monitor temperature   - reviewed prior medical records to evaluate for resistant or atypical pathogens   - day #3 ceftriaxone  - tolerating antibiotics without rashes or side effects   - E coli is sensitive to ceftriaxone; okay from id standpoint  to discharge on ceftin 500 mg po q 12 hours for 7 more days  2. other issues: per medicine

## 2019-09-03 NOTE — DISCHARGE NOTE NURSING/CASE MANAGEMENT/SOCIAL WORK - PATIENT PORTAL LINK FT
You can access the FollowMyHealth Patient Portal offered by [facility] by registering at the following website: [email or web address]. By joining [employer]’s FollowMyHealth portal, you will also be able to view your health information using other applications (apps) compatible with our system.

## 2019-09-03 NOTE — PROVIDER CONTACT NOTE (OTHER) - ACTION/TREATMENT ORDERED:
Tylenol 650mg PO STAT for temperature, saline lock pt. until MD comes to assess.
MD Nuñez made aware, md gave order to administer tylenol, no orders for lasix at this time, pt advised to have HOB slightly raised, will continue to monitor

## 2019-09-03 NOTE — DISCHARGE NOTE PROVIDER - NSDCCPCAREPLAN_GEN_ALL_CORE_FT
PRINCIPAL DISCHARGE DIAGNOSIS  Diagnosis: Pyelonephritis  Assessment and Plan of Treatment: take oral Ceftin 500mg twice a day for 7 days.

## 2019-09-03 NOTE — DISCHARGE NOTE PROVIDER - CARE PROVIDER_API CALL
Radha Song (DO)  Family Medicine  [address] [place] [address]  Phone: [phone]  Fax: [phone]  Follow Up Time:

## 2019-09-03 NOTE — DISCHARGE NOTE PROVIDER - HOSPITAL COURSE
Vital Signs Last 24 Hrs    T(C): 36.7 (03 Sep 2019 11:26), Max: 37.9 (02 Sep 2019 22:31)    T(F): 98.1 (03 Sep 2019 11:26), Max: 100.2 (02 Sep 2019 22:31)    HR: 64 (03 Sep 2019 11:26) (64 - 69)    BP: 136/75 (03 Sep 2019 11:26) (118/69 - 141/75)    BP(mean): --    RR: 18 (03 Sep 2019 11:26) (18 - 18)    SpO2: 97% (03 Sep 2019 11:26) (94% - 99%)        HEENT:   pupils equal and reactive, EOMI, no oropharyngeal lesions, erythema, exudates, oral thrush        NECK:   supple, no carotid bruits, no palpable lymph nodes, no thyromegaly        CV:  +S1, +S2, regular, no murmurs or rubs        RESP:   lungs clear to auscultation bilaterally, no wheezing, rales, rhonchi, good air entry bilaterally        BREAST:  not examined        GI:  abdomen soft, non-tender, non-distended, normal BS, no bruits, no abdominal masses, no palpable masses        RECTAL:  not examined        :  not examined        MSK:   normal muscle tone, no atrophy, no rigidity, no contractions        EXT:   no clubbing, no cyanosis, no edema, no calf pain, swelling or erythema        VASCULAR:  pulses equal and symmetric in the upper and lower extremities        NEURO:  AAOX3, no focal neurological deficits, follows all commands, able to move extremities spontaneously        SKIN:  no ulcers, lesions or rashes                Hospital course:        44y female p/w abd pain, n/v, fever 102.  CT +pyelonephritis and pt d/c home from ed on keflex but couldn't tolerate and returned to ED.  Pt was feeling better but still w/ intermittent RLQ abd pain, nonradiating.  tmax 100.1    tmax 100.5 and presented to the ED. Admitted for UTI with olikley underlying pyelonephritis.         Admitted with UTI, pyelonephritis;  Patient was started on IV ceftriaxone. She has had reslution of her fevers. She now feels well and is anxous to return home. Patient urine culture grew ecoli. Now cleared for discharged with oral ceftin for 7 more days.

## 2019-09-03 NOTE — PROGRESS NOTE ADULT - SUBJECTIVE AND OBJECTIVE BOX
Patient is a 44y old  Female who presents with a chief complaint of Pyelonephritis (01 Sep 2019 08:14)    Date of service: 09-03-19 @ 10:51    Patient sitting in bed  Abdominal pain resolved  Low grade fever        ROS: no  chills; denies dizziness, no HA, no SOB or cough, no abdominal pain, no diarrhea or constipation; no dysuria, no urinary frequency, no legs pain, no rashes    MEDICATIONS  (STANDING):  levothyroxine 100 MICROGram(s) Oral daily  lisinopril 10 milliGRAM(s) Oral daily    MEDICATIONS  (PRN):  acetaminophen   Tablet .. 650 milliGRAM(s) Oral every 6 hours PRN Temp greater or equal to 38C (100.4F)  acetaminophen 325 mG/butalbital 50 mG/caffeine 40 mG 1 Tablet(s) Oral every 8 hours PRN migraine headache  ondansetron   Disintegrating Tablet 4 milliGRAM(s) Oral every 6 hours PRN Nausea and/or Vomiting      Vital Signs Last 24 Hrs  T(C): 37.4 (03 Sep 2019 05:29), Max: 38.1 (02 Sep 2019 11:09)  T(F): 99.3 (03 Sep 2019 05:29), Max: 100.5 (02 Sep 2019 11:09)  HR: 69 (03 Sep 2019 05:29) (67 - 69)  BP: 135/78 (03 Sep 2019 05:29) (118/69 - 141/75)  BP(mean): --  RR: 18 (02 Sep 2019 22:31) (18 - 18)  SpO2: 94% (03 Sep 2019 05:29) (94% - 99%)    Physical Exam:        Constitutional: frail looking  HEENT: NC/AT, EOMI, PERRLA, conjunctivae clear; ears and nose atraumatic; pharynx clear  Neck: supple; thyroid not palpable  Back: no tenderness  Respiratory: respiratory effort normal; clear to auscultation  Cardiovascular: S1S2 regular, no murmurs  Abdomen: soft, right lower quadrant  tenderness resolved, not distended, positive BS; no liver or spleen organomegaly  Genitourinary: no suprapubic tenderness  Musculoskeletal: no muscle tenderness, no joint swelling or tenderness  Neurological/ Psychiatric: AxOx3, judgement and insight normal;  moving all extremities  Skin: no rashes; no palpable lesions    Labs: all available labs reviewed                         Labs:             Labs:                 Cultures:       Culture - Urine (collected 08-31-19 @ 18:47)  Source: .Urine None  Final Report (09-01-19 @ 19:04):    No growth    Culture - Blood (collected 08-31-19 @ 18:47)  Source: .Blood None  Preliminary Report (09-02-19 @ 01:02):    No growth to date.    Culture - Blood (collected 08-31-19 @ 18:47)  Source: .Blood None  Preliminary Report (09-02-19 @ 01:02):    No growth to date.    Culture - Urine (collected 08-31-19 @ 01:40)  Source: .Urine None  Final Report (09-02-19 @ 10:24):    50,000 - 99,000 CFU/mL Escherichia coli  Organism: Escherichia coli (09-02-19 @ 10:24)  Organism: Escherichia coli (09-02-19 @ 10:24)      -  Amikacin: S <=16      -  Ampicillin: S <=8 These ampicillin results predict results for amoxicillin      -  Ampicillin/Sulbactam: S <=8/4 Enterobacter, Citrobacter, and Serratia may develop resistance during prolonged therapy (3-4 days)      -  Aztreonam: S <=4      -  Cefazolin: S <=8 (MIC_CL_COM_ENTERIC_CEFAZU) For uncomplicated UTI with K. pneumoniae, E. coli, or P. mirablis: ALEXIS <=16 is sensitive and ALEXIS >=32 is resistant. This also predicts results for oral agents cefaclor, cefdinir, cefpodoxime, cefprozil, cefuroxime axetil, cephalexin and locarbef for uncomplicated UTI. Note that some isolates may be susceptible to these agents while testing resistant to cefazolin.      -  Cefepime: S <=4      -  Cefoxitin: S <=8      -  Ceftriaxone: S <=1 Enterobacter, Citrobacter, and Serratia may develop resistance during prolonged therapy      -  Ciprofloxacin: S <=1      -  Gentamicin: S <=4      -  Imipenem: S <=1      -  Levofloxacin: S <=2      -  Meropenem: S <=1      -  Nitrofurantoin: S <=32 Should not be used to treat pyelonephritis      -  Piperacillin/Tazobactam: S <=16      -  Tigecycline: S <=2      -  Tobramycin: S <=4      -  Trimethoprim/Sulfamethoxazole: S <=2/38      Method Type: ALEXIS    Culture - Blood (collected 08-31-19 @ 00:05)  Source: .Blood Blood-Peripheral  Preliminary Report (09-01-19 @ 09:02):    No growth to date.    Culture - Blood (collected 08-31-19 @ 00:05)  Source: .Blood Blood-Peripheral  Preliminary Report (09-01-19 @ 09:02):    No growth to date.          < from: CT Abdomen and Pelvis w/ IV Cont (08.31.19 @ 02:35) >    EXAM:  CT ABDOMEN AND PELVIS IC                            PROCEDURE DATE:  08/31/2019          INTERPRETATION:  Abdominal/Pelvic CT    8/31/2019 2:38 AM    Indication: Right-sided abdominal pain    Technique: Axial images were obtained following IV contrast from the lung   bases through pubic symphysis.  90 cc of Omnipaque 350 was administered   intravenously without complication and 10 cc was discarded.  Reformatted   coronal and sagittal images are submitted.    Comparison: None    FINDINGS:    LUNG BASES:  There are no pleural effusions. Dependent atelectatic   changes at the lung bases.  PERITONEUM:  There is no free air or focal collection.  No free fluid.  LIVER: A couple of cysts centimeter lucencies in the lateral segment of   the left lobe.  SPLEEN: Normal.  GALLBLADDER: Unremarkable.  BILIARY TREE: Unremarkable.  PANCREAS: Normal.  ADRENAL GLANDS: Normal.  KIDNEYS: No hydronephrosis. Mild right perinephric fluid and right   urothelial thickening without obstructing stone. Findings suggest right   ureteritis.  BOWEL: The stomach is incompletely distended.  There is no small bowel   obstruction, focal bowel wall thickening or diverticulitis. The appendix   is unremarkable.    URINARY BLADDER: Incompletely distended.  PELVICORGANS: No pelvic masses.    There is no significant adenopathy.  VASCULATURE: Unremarkable.  RETROPERITONEUM:  There is no mass.  BONES: Unremarkable.  ABDOMINAL WALL: Fat-containing umbilical hernia.    IMPRESSION:    Mild right perinephric fluid and right urothelial enhancement without   obstructing stone. Findings may be related to recent passage of a stone.   Correlate clinically for right ureteritis. No hydronephrosis.  Normal appendix.    < end of copied text >        Radiology: all available radiological tests reviewed    Advanced directives addressed: full resuscitation

## 2019-09-03 NOTE — PROGRESS NOTE ADULT - SUBJECTIVE AND OBJECTIVE BOX
CHIEF COMPLAINT:    SUBJECTIVE:     REVIEW OF SYSTEMS:    CONSTITUTIONAL: No weakness, fevers or chills  EYES/ENT: No visual changes;  No vertigo or throat pain   NECK: No pain or stiffness  RESPIRATORY: No cough, wheezing, hemoptysis; No shortness of breath  CARDIOVASCULAR: No chest pain or palpitations  GASTROINTESTINAL: No abdominal or epigastric pain. No nausea, vomiting, or hematemesis; No diarrhea or constipation. No melena or hematochezia.  GENITOURINARY: No dysuria, frequency or hematuria  NEUROLOGICAL: No numbness or weakness  SKIN: No itching, burning, rashes, or lesions   All other review of systems is negative unless indicated above    Vital Signs Last 24 Hrs  T(C): 37.4 (03 Sep 2019 05:29), Max: 38.1 (02 Sep 2019 11:09)  T(F): 99.3 (03 Sep 2019 05:29), Max: 100.5 (02 Sep 2019 11:09)  HR: 69 (03 Sep 2019 05:29) (67 - 69)  BP: 135/78 (03 Sep 2019 05:29) (118/69 - 141/75)  BP(mean): --  RR: 18 (02 Sep 2019 22:31) (18 - 18)  SpO2: 94% (03 Sep 2019 05:29) (94% - 99%)    I&O's Summary      CAPILLARY BLOOD GLUCOSE          PHYSICAL EXAM:    Constitutional: NAD, awake and alert, well-developed  HEENT: PERR, EOMI, Normal Hearing, MMM  Neck: Soft and supple, No LAD, No JVD  Respiratory: Breath sounds are clear bilaterally, No wheezing, rales or rhonchi  Cardiovascular: S1 and S2, regular rate and rhythm, no Murmurs, gallops or rubs  Gastrointestinal: Bowel Sounds present, soft, nontender, nondistended, no guarding, no rebound  Extremities: No peripheral edema  Vascular: 2+ peripheral pulses  Neurological: A/O x 3, no focal deficits  Musculoskeletal: 5/5 strength b/l upper and lower extremities  Skin: No rashes    MEDICATIONS:  MEDICATIONS  (STANDING):  levothyroxine 100 MICROGram(s) Oral daily  lisinopril 10 milliGRAM(s) Oral daily      LABS: All Labs Reviewed:        Blood Culture: 08-31 @ 18:47  Organism --  Gram Stain Blood -- Gram Stain --  Specimen Source .Blood None  Culture-Blood --    08-31 @ 01:40  Organism Escherichia coli  Gram Stain Blood -- Gram Stain --  Specimen Source .Urine None  Culture-Blood --    08-31 @ 00:05  Organism --  Gram Stain Blood -- Gram Stain --  Specimen Source .Blood Blood-Peripheral  Culture-Blood --            Assessment and Plan:      44y female p/w abd pain, n/v, fever 102.  CT +pyelonephritis and pt d/c home from ed on keflex but couldn't tolerate and returned to ED.  Pt feeling better today but still w/ intermittent RLQ abd pain, nonradiating. No flank pain, no n/v/d, no f/u/d , no hematuria.  tmax 100.1  9/3- intermittent rlq pain.  No n/v/d, no flank pain, no f/u/d.   tmax 100.5          1. UTI, pyelonephritis  - urine cx GNR , pending sensitivities  - blood cultures no growth  - continue iv rocephin day 2   - d/c ivf        2. hypokalemia  repleted    dvt px  ambulate

## 2019-09-05 LAB
CULTURE RESULTS: SIGNIFICANT CHANGE UP
CULTURE RESULTS: SIGNIFICANT CHANGE UP
SPECIMEN SOURCE: SIGNIFICANT CHANGE UP
SPECIMEN SOURCE: SIGNIFICANT CHANGE UP

## 2019-09-10 DIAGNOSIS — N39.0 URINARY TRACT INFECTION, SITE NOT SPECIFIED: ICD-10-CM

## 2019-09-10 DIAGNOSIS — I10 ESSENTIAL (PRIMARY) HYPERTENSION: ICD-10-CM

## 2019-09-10 DIAGNOSIS — Z85.850 PERSONAL HISTORY OF MALIGNANT NEOPLASM OF THYROID: ICD-10-CM

## 2019-09-10 DIAGNOSIS — N12 TUBULO-INTERSTITIAL NEPHRITIS, NOT SPECIFIED AS ACUTE OR CHRONIC: ICD-10-CM

## 2019-09-10 DIAGNOSIS — E87.6 HYPOKALEMIA: ICD-10-CM

## 2019-09-10 DIAGNOSIS — N28.89 OTHER SPECIFIED DISORDERS OF KIDNEY AND URETER: ICD-10-CM

## 2019-09-10 DIAGNOSIS — E89.0 POSTPROCEDURAL HYPOTHYROIDISM: ICD-10-CM

## 2019-09-19 ENCOUNTER — APPOINTMENT (OUTPATIENT)
Dept: OBGYN | Facility: CLINIC | Age: 44
End: 2019-09-19
Payer: COMMERCIAL

## 2019-09-19 VITALS
SYSTOLIC BLOOD PRESSURE: 140 MMHG | DIASTOLIC BLOOD PRESSURE: 80 MMHG | BODY MASS INDEX: 30.12 KG/M2 | TEMPERATURE: 98.5 F | WEIGHT: 169.97 LBS | HEIGHT: 63 IN

## 2019-09-19 DIAGNOSIS — Z78.9 OTHER SPECIFIED HEALTH STATUS: ICD-10-CM

## 2019-09-19 DIAGNOSIS — R23.2 FLUSHING: ICD-10-CM

## 2019-09-19 DIAGNOSIS — N23 UNSPECIFIED RENAL COLIC: ICD-10-CM

## 2019-09-19 DIAGNOSIS — N63.20 UNSPECIFIED LUMP IN THE LEFT BREAST, UNSPECIFIED QUADRANT: ICD-10-CM

## 2019-09-19 PROCEDURE — 99396 PREV VISIT EST AGE 40-64: CPT

## 2019-09-19 NOTE — REVIEW OF SYSTEMS
[Recent Wt Gain ___ Lbs] : recent [unfilled] ~Ulb weight gain [Incontinence] : incontinence [Sleep Disturbances] : sleep disturbances [Hot Flashes] : hot flashes [Nl] : Integumentary

## 2019-09-20 LAB — HPV HIGH+LOW RISK DNA PNL CVX: NOT DETECTED

## 2019-11-14 ENCOUNTER — OTHER (OUTPATIENT)
Age: 44
End: 2019-11-14

## 2019-11-25 ENCOUNTER — OTHER (OUTPATIENT)
Age: 44
End: 2019-11-25

## 2019-12-06 ENCOUNTER — CLINICAL ADVICE (OUTPATIENT)
Age: 44
End: 2019-12-06

## 2019-12-06 ENCOUNTER — OTHER (OUTPATIENT)
Age: 44
End: 2019-12-06

## 2020-04-07 ENCOUNTER — APPOINTMENT (OUTPATIENT)
Dept: SURGERY | Facility: CLINIC | Age: 45
End: 2020-04-07

## 2020-10-09 ENCOUNTER — APPOINTMENT (OUTPATIENT)
Dept: CARDIOLOGY | Facility: CLINIC | Age: 45
End: 2020-10-09
Payer: COMMERCIAL

## 2020-10-09 VITALS
SYSTOLIC BLOOD PRESSURE: 130 MMHG | BODY MASS INDEX: 29.77 KG/M2 | RESPIRATION RATE: 16 BRPM | HEIGHT: 63 IN | HEART RATE: 58 BPM | WEIGHT: 168 LBS | TEMPERATURE: 98 F | DIASTOLIC BLOOD PRESSURE: 76 MMHG

## 2020-10-09 DIAGNOSIS — R01.1 CARDIAC MURMUR, UNSPECIFIED: ICD-10-CM

## 2020-10-09 DIAGNOSIS — Z86.79 PERSONAL HISTORY OF OTHER DISEASES OF THE CIRCULATORY SYSTEM: ICD-10-CM

## 2020-10-09 PROCEDURE — 99214 OFFICE O/P EST MOD 30 MIN: CPT

## 2020-10-09 PROCEDURE — 93000 ELECTROCARDIOGRAM COMPLETE: CPT

## 2020-10-09 RX ORDER — ESTRADIOL 1.53 MG/1
1.53 SPRAY TRANSDERMAL
Qty: 8.1 | Refills: 3 | Status: DISCONTINUED | COMMUNITY
Start: 2017-09-14 | End: 2020-10-09

## 2020-10-09 RX ORDER — ESTRADIOL 0.1 MG/D
0.1 PATCH TRANSDERMAL
Qty: 12 | Refills: 3 | Status: DISCONTINUED | COMMUNITY
Start: 2017-09-19 | End: 2020-10-09

## 2020-10-09 RX ORDER — LISINOPRIL 10 MG/1
10 TABLET ORAL
Refills: 0 | Status: DISCONTINUED | COMMUNITY
End: 2020-10-09

## 2020-11-09 ENCOUNTER — APPOINTMENT (OUTPATIENT)
Dept: OBGYN | Facility: CLINIC | Age: 45
End: 2020-11-09
Payer: COMMERCIAL

## 2020-11-09 VITALS
SYSTOLIC BLOOD PRESSURE: 124 MMHG | DIASTOLIC BLOOD PRESSURE: 80 MMHG | WEIGHT: 168 LBS | HEIGHT: 63 IN | BODY MASS INDEX: 29.77 KG/M2

## 2020-11-09 DIAGNOSIS — N95.1 MENOPAUSAL AND FEMALE CLIMACTERIC STATES: ICD-10-CM

## 2020-11-09 PROCEDURE — 82270 OCCULT BLOOD FECES: CPT

## 2020-11-09 PROCEDURE — 99072 ADDL SUPL MATRL&STAF TM PHE: CPT

## 2020-11-09 PROCEDURE — 99396 PREV VISIT EST AGE 40-64: CPT

## 2020-11-09 NOTE — COUNSELING
[Nutrition/ Exercise/ Weight Management] : nutrition, exercise, weight management [Body Image] : body image [Vitamins/Supplements] : vitamins/supplements [Breast Self Exam] : breast self exam [Influenza Vaccine] : influenza vaccine

## 2020-11-09 NOTE — PLAN
[FreeTextEntry1] : PT WITH MIXED INCONTINENCE. DISCUSSED WT GAIN AND URINE LOSS. WILL GIVE TRIAL OF MYRBETRIQ. INFORMATION GIVEN. FINDINGS DISCUSSED FOLLOWUP 2 MONTHS . MAMMO AND SONO ORDERED

## 2020-11-17 ENCOUNTER — NON-APPOINTMENT (OUTPATIENT)
Age: 45
End: 2020-11-17

## 2020-11-17 ENCOUNTER — APPOINTMENT (OUTPATIENT)
Dept: CARDIOLOGY | Facility: CLINIC | Age: 45
End: 2020-11-17
Payer: COMMERCIAL

## 2020-11-17 PROCEDURE — 93015 CV STRESS TEST SUPVJ I&R: CPT

## 2020-11-17 PROCEDURE — 93306 TTE W/DOPPLER COMPLETE: CPT

## 2020-11-17 RX ADMIN — PERFLUTREN MG/ML: 6.52 INJECTION, SUSPENSION INTRAVENOUS at 00:00

## 2020-11-18 RX ORDER — PERFLUTREN 6.52 MG/ML
6.52 INJECTION, SUSPENSION INTRAVENOUS
Qty: 2 | Refills: 0 | Status: COMPLETED | OUTPATIENT
Start: 2020-11-17

## 2021-01-05 ENCOUNTER — NON-APPOINTMENT (OUTPATIENT)
Age: 46
End: 2021-01-05

## 2021-01-07 DIAGNOSIS — R94.39 ABNORMAL RESULT OF OTHER CARDIOVASCULAR FUNCTION STUDY: ICD-10-CM

## 2021-01-08 ENCOUNTER — APPOINTMENT (OUTPATIENT)
Dept: CARDIOLOGY | Facility: CLINIC | Age: 46
End: 2021-01-08
Payer: MEDICAID

## 2021-01-08 PROCEDURE — 93015 CV STRESS TEST SUPVJ I&R: CPT

## 2021-01-08 PROCEDURE — 99072 ADDL SUPL MATRL&STAF TM PHE: CPT

## 2021-01-08 PROCEDURE — 78452 HT MUSCLE IMAGE SPECT MULT: CPT

## 2021-01-08 PROCEDURE — A9500: CPT

## 2021-01-08 RX ADMIN — REGADENOSON 0 MG/5ML: 0.08 INJECTION, SOLUTION INTRAVENOUS at 00:00

## 2021-01-11 ENCOUNTER — NON-APPOINTMENT (OUTPATIENT)
Age: 46
End: 2021-01-11

## 2021-01-15 RX ORDER — REGADENOSON 0.08 MG/ML
0.4 INJECTION, SOLUTION INTRAVENOUS
Qty: 4 | Refills: 0 | Status: COMPLETED | OUTPATIENT
Start: 2021-01-08

## 2021-01-29 ENCOUNTER — APPOINTMENT (OUTPATIENT)
Dept: OBGYN | Facility: CLINIC | Age: 46
End: 2021-01-29
Payer: MEDICAID

## 2021-01-29 VITALS
HEIGHT: 63 IN | DIASTOLIC BLOOD PRESSURE: 72 MMHG | SYSTOLIC BLOOD PRESSURE: 104 MMHG | WEIGHT: 165 LBS | BODY MASS INDEX: 29.23 KG/M2

## 2021-01-29 DIAGNOSIS — B00.1 HERPESVIRAL VESICULAR DERMATITIS: ICD-10-CM

## 2021-01-29 PROCEDURE — 99212 OFFICE O/P EST SF 10 MIN: CPT

## 2021-01-29 PROCEDURE — 99072 ADDL SUPL MATRL&STAF TM PHE: CPT

## 2021-01-29 NOTE — REVIEW OF SYSTEMS
[Patient Intake Form Reviewed] : Patient intake form was reviewed [Incontinence] : incontinence [Negative] : Heme/Lymph

## 2021-02-01 PROBLEM — B00.1 HERPES LABIALIS WITHOUT COMPLICATION: Status: ACTIVE | Noted: 2021-02-01

## 2021-02-01 NOTE — PLAN
[FreeTextEntry1] : PT WITH MIXED INCONTINENCE. DISCUSSED ISSUES WITH INSURANCE. WILL SWITCH TO DITROPAN XL. RX SENT. DISCUSSED POSSIBLE SIDE EFFECTS. RX FOR VALTREX GIVEN FOR COLD SORES

## 2021-02-17 DIAGNOSIS — Z01.818 ENCOUNTER FOR OTHER PREPROCEDURAL EXAMINATION: ICD-10-CM

## 2021-02-18 RX ORDER — KIT FOR THE PREPARATION OF TECHNETIUM TC99M SESTAMIBI 1 MG/5ML
INJECTION, POWDER, LYOPHILIZED, FOR SOLUTION PARENTERAL
Refills: 0 | Status: COMPLETED | OUTPATIENT
Start: 2021-02-18

## 2021-02-18 RX ADMIN — KIT FOR THE PREPARATION OF TECHNETIUM TC99M SESTAMIBI 0: 1 INJECTION, POWDER, LYOPHILIZED, FOR SOLUTION PARENTERAL at 00:00

## 2021-02-23 ENCOUNTER — APPOINTMENT (OUTPATIENT)
Dept: DISASTER EMERGENCY | Facility: CLINIC | Age: 46
End: 2021-02-23

## 2021-02-24 LAB — SARS-COV-2 N GENE NPH QL NAA+PROBE: NOT DETECTED

## 2021-02-25 RX ORDER — CHLORHEXIDINE GLUCONATE 213 G/1000ML
1 SOLUTION TOPICAL ONCE
Refills: 0 | Status: DISCONTINUED | OUTPATIENT
Start: 2021-02-26 | End: 2021-03-12

## 2021-02-25 NOTE — H&P PST ADULT - OTHER CARE PROVIDERS
Dr. Killian Ciro Killian ([facility], Cardiology at [place], [address], [place] [address], [phone])

## 2021-02-25 NOTE — H&P PST ADULT - NEGATIVE NEUROLOGICAL SYMPTOMS
no transient paralysis/no weakness/no generalized seizures/no focal seizures/no syncope/no tremors/no vertigo/no loss of sensation/no difficulty walking/no headache/no loss of consciousness/no hemiparesis

## 2021-02-25 NOTE — H&P PST ADULT - PRIMARY CARE PROVIDER
Radha Song ([address] [place] [id number], Phone: [phone], Fax: [phone])

## 2021-02-25 NOTE — H&P PST ADULT - NSICDXPASTSURGICALHX_GEN_ALL_CORE_FT
PAST SURGICAL HISTORY:  History of dilatation and curettage for endometrosis      History of umbilical hernia repair pt states hernia has retured    R Kidney Stent Placed then removed due to kidney infection as per pt    S/P right oophorectomy May 2011 (due to endometriosis)     PAST SURGICAL HISTORY:  History of dilatation and curettage for endometrosis      History of lumpectomy of left breast     History of thyroidectomy     History of total abdominal hysterectomy and bilateral salpingo-oophorectomy     History of umbilical hernia repair pt states hernia has retured    R Kidney Stent Placed then removed due to kidney infection as per pt

## 2021-02-25 NOTE — H&P PST ADULT - ASSESSMENT
44 yo female with abnormal stress test for LHC Assessment: 46y/o female never smoker with history of HLD, HTN, prediabetes, COVID 19 infection in March, 2020, left breast cancer (radiation and lumpectomy), thyroid cancer, S/P thyroidectomy, and rheumatic fever as a child, who had an abnormal EKG (T wave inversions in V1-4). She was referred to cardiology and had a nuclear stress test which showed a small to moderate sized, moderate intensity, significant partial reversible defect of the mid apical anterior and apical walls consistent with ischemia and breast attenuation artifact. She does admit to [facility] (she states she got SOB walking from waiting room to her stretcher), occasional palpitations, BLE edema, and nocturnal cough. She denies chest pain, orthopnea, PND, nausea, fatigue or syncope/near syncope.    ASA: 3  Mall: 2  ABR: 1.6%  COVID: Negative (2/23/2021)    Problem List:   1. Stable angina with intermediate risk nuclear stress test while on 1 antianginal therapy  ·	LHC and possible intervention. Consent obtained.  ·	Will start DAPT if PCI performed.  ·	IV: NS KVO  ·	Aspirin if not taken today.    2. HTN  ·	Continue Toprol 25 mg daily  ·	Continue lisinopril-HCTZ 10-12.5 mg daily    3. HLD  ·	Will start statin if CAD noted.  ·	Will check lipid panel in AM if PCI performed.    4. Prediabetes  ·	Will check HgbA1C if PCI performed.  ·	Will hold metformin for 48 hours [post procedure.

## 2021-02-25 NOTE — H&P PST ADULT - NSICDXPASTMEDICALHX_GEN_ALL_CORE_FT
PAST MEDICAL HISTORY:  Anemia     Endometriosis, uterus     Heart murmur due to rheumatic fever    History of hypothyroidism     HTN - Hypertension was on Diovan for 2 months    Malignant neoplasm of thyroid gland      PAST MEDICAL HISTORY:  Anemia     Carpal tunnel syndrome of right wrist     COVID-19 virus infection     Endometriosis, uterus     Essential hypertension     Hyperlipidemia, unspecified hyperlipidemia type     Malignant neoplasm of left female breast, unspecified estrogen receptor status, unspecified site of     Malignant neoplasm of thyroid gland     Postoperative hypothyroidism     Prediabetes     Rheumatic fever

## 2021-02-25 NOTE — H&P PST ADULT - HISTORY OF PRESENT ILLNESS
Narrative:   46 yo male with pmhx HLD, HTN, COVID 19 in the past with an abnormal ekg was referred for nuclear stress test which was abnormal. She presents for [facility].     Symptoms:        Angina (Class):        Ischemic Symptoms:     Heart Failure:        Systolic/Diastolic/Combined:        NYHA Class (within 2 weeks):     Assessment of LVEF:       EF: 60%       Assessed by: TTE       Date: 11/17/2020    Prior Cardiac Interventions:       PCI's:        CABG:     Noninvasive Testing:   Stress Test: Date: 1/8/2021       Protocol: Rest stress perfusion imaging       Duration of Exercise: NA       Symptoms: SOB       EKG Changes: negative for ischemia       DTS:        Myocardial Imaging: small to moderte sized, moderate intensity, significant partial reversibility defect of the mid apical anterior and apical walls consistent with ischemia and breast attenuation artifact.       Risk Assessment: intermediate    Echo: 11/17/2020: EF 60-65%. Normal left ventricular size, and wall thickness with normal systolic and diastolic function.    Antianginal Therapies:        Beta Blockers:  metoprolol       Calcium Channel Blockers:        Long Acting Nitrates:        Ranexa:     Associated Risk Factors:        Cerebrovascular Disease: N/A       Chronic Lung Disease: N/A       Peripheral Arterial Disease: N/A       Chronic Kidney Disease (if yes, what is GFR): N/A       Uncontrolled Diabetes (if yes, what is HgbA1C or FBS): N/A       Poorly Controlled Hypertension (if yes, what is SBP): N/A       Morbid Obesity (if yes, what is BMI): N/A       History of Recent Ventricular Arrhythmia: N/A       Inability to Ambulate Safely: N/A       Need for Therapeutic Anticoagulation: N/A       Antiplatelet or Contrast Allergy: N/A 46y/o female never smoker with history of HLD, HTN, prediabetes, COVID 19 infection in March, 2020, left breast cancer (radiation and lumpectomy), thyroid cancer, S/P thyroidectomy, and rheumatic fever as a child, who had an abnormal EKG (T wave inversions in V1-4). She was referred to cardiology and had a nuclear stress test which showed a small to moderate sized, moderate intensity, significant partial reversible defect of the mid apical anterior and apical walls consistent with ischemia and breast attenuation artifact. She does admit to [facility] (she states she got SOB walking from waiting room to her stretcher), occasional palpitations, BLE edema, and nocturnal cough. She denies chest pain, orthopnea, PND, nausea, fatigue or syncope/near syncope.    Symptoms:        Angina (Class): N/A       Ischemic Symptoms: (CCS class 3 anginal equivalent)    Heart Failure: N/A    Assessment of LVEF:       EF: 60%       Assessed by: TTE       Date: 11/17/2020    Prior Cardiac Interventions:       PCI's: N/A       CABG: N/A    Noninvasive Testing:   Stress Test: Date: 1/8/2021       Protocol: Rest stress perfusion imaging       Duration of Exercise: NA       Symptoms: SOB       EKG Changes: negative for ischemia       DTS: N/A       Myocardial Imaging: small to moderate sized, moderate intensity, significant partial reversible defect of the mid apical anterior and apical walls consistent with ischemia and breast attenuation artifact.       Risk Assessment: intermediate    Echo: 11/17/2020       LVSF: Normal       EF: 60-65%       RVSF: Normal       LA: Normal       RA: Normal       Mitral Valve: Normal       Aortic Valve: Normal       Tricuspid Valve: Trivial TR       Pulmonic Valve: Not well seen       Pericardium: No evidence of pericardial effusion    Antianginal Therapies:        Beta Blockers: Toprol 25 mg daily       Calcium Channel Blockers: N/A       Long Acting Nitrates: N/A       Ranexa: N/A    Associated Risk Factors:        Cerebrovascular Disease: N/A       Chronic Lung Disease: N/A       Peripheral Arterial Disease: N/A       Chronic Kidney Disease (if yes, what is GFR): N/A       Uncontrolled Diabetes (if yes, what is HgbA1C or FBS): N/A       Poorly Controlled Hypertension (if yes, what is SBP): N/A       Morbid Obesity (if yes, what is BMI): N/A       History of Recent Ventricular Arrhythmia: N/A       Inability to Ambulate Safely: N/A       Need for Therapeutic Anticoagulation: N/A       Antiplatelet or Contrast Allergy: N/A

## 2021-02-25 NOTE — H&P PST ADULT - NSICDXFAMILYHX_GEN_ALL_CORE_FT
FAMILY HISTORY:  Mother  Still living? Yes, Estimated age: 71-80  Family history of diabetes mellitus, Age at diagnosis: Age Unknown  Family history of hypertension, Age at diagnosis: Age Unknown  Family history of kidney disease, Age at diagnosis: Age Unknown

## 2021-02-26 ENCOUNTER — OUTPATIENT (OUTPATIENT)
Dept: OUTPATIENT SERVICES | Facility: HOSPITAL | Age: 46
LOS: 1 days | Discharge: HOME | End: 2021-02-26
Payer: COMMERCIAL

## 2021-02-26 ENCOUNTER — TRANSCRIPTION ENCOUNTER (OUTPATIENT)
Age: 46
End: 2021-02-26

## 2021-02-26 VITALS
OXYGEN SATURATION: 99 % | WEIGHT: 164.91 LBS | HEIGHT: 62 IN | TEMPERATURE: 98 F | HEART RATE: 58 BPM | SYSTOLIC BLOOD PRESSURE: 139 MMHG | RESPIRATION RATE: 18 BRPM | DIASTOLIC BLOOD PRESSURE: 84 MMHG

## 2021-02-26 VITALS
SYSTOLIC BLOOD PRESSURE: 136 MMHG | HEART RATE: 68 BPM | DIASTOLIC BLOOD PRESSURE: 74 MMHG | RESPIRATION RATE: 18 BRPM | OXYGEN SATURATION: 99 %

## 2021-02-26 DIAGNOSIS — Z98.890 OTHER SPECIFIED POSTPROCEDURAL STATES: Chronic | ICD-10-CM

## 2021-02-26 DIAGNOSIS — Z90.710 ACQUIRED ABSENCE OF BOTH CERVIX AND UTERUS: Chronic | ICD-10-CM

## 2021-02-26 DIAGNOSIS — R94.39 ABNORMAL RESULT OF OTHER CARDIOVASCULAR FUNCTION STUDY: ICD-10-CM

## 2021-02-26 DIAGNOSIS — E89.0 POSTPROCEDURAL HYPOTHYROIDISM: Chronic | ICD-10-CM

## 2021-02-26 LAB
ANION GAP SERPL CALC-SCNC: 10 MMOL/L — SIGNIFICANT CHANGE UP (ref 5–17)
ANISOCYTOSIS BLD QL: SLIGHT — SIGNIFICANT CHANGE UP
APTT BLD: 31.8 SEC — SIGNIFICANT CHANGE UP (ref 27.5–35.5)
BASOPHILS # BLD AUTO: 0 K/UL — SIGNIFICANT CHANGE UP (ref 0–0.2)
BASOPHILS NFR BLD AUTO: 0 % — SIGNIFICANT CHANGE UP (ref 0–2)
BUN SERPL-MCNC: 28 MG/DL — HIGH (ref 8–20)
CALCIUM SERPL-MCNC: 9.1 MG/DL — SIGNIFICANT CHANGE UP (ref 8.6–10.2)
CHLORIDE SERPL-SCNC: 104 MMOL/L — SIGNIFICANT CHANGE UP (ref 98–107)
CO2 SERPL-SCNC: 26 MMOL/L — SIGNIFICANT CHANGE UP (ref 22–29)
CREAT SERPL-MCNC: 0.7 MG/DL — SIGNIFICANT CHANGE UP (ref 0.5–1.3)
EOSINOPHIL # BLD AUTO: 0.09 K/UL — SIGNIFICANT CHANGE UP (ref 0–0.5)
EOSINOPHIL NFR BLD AUTO: 2.6 % — SIGNIFICANT CHANGE UP (ref 0–6)
GLUCOSE SERPL-MCNC: 103 MG/DL — HIGH (ref 70–99)
HCG SERPL QL: NEGATIVE — SIGNIFICANT CHANGE UP
HCG SERPL-ACNC: <4 MIU/ML — SIGNIFICANT CHANGE UP
HCT VFR BLD CALC: 34.8 % — SIGNIFICANT CHANGE UP (ref 34.5–45)
HGB BLD-MCNC: 11.6 G/DL — SIGNIFICANT CHANGE UP (ref 11.5–15.5)
HYPOCHROMIA BLD QL: SLIGHT — SIGNIFICANT CHANGE UP
INR BLD: 0.97 RATIO — SIGNIFICANT CHANGE UP (ref 0.88–1.16)
LYMPHOCYTES # BLD AUTO: 1.34 K/UL — SIGNIFICANT CHANGE UP (ref 1–3.3)
LYMPHOCYTES # BLD AUTO: 38.6 % — SIGNIFICANT CHANGE UP (ref 13–44)
MAGNESIUM SERPL-MCNC: 1.9 MG/DL — SIGNIFICANT CHANGE UP (ref 1.6–2.6)
MANUAL SMEAR VERIFICATION: SIGNIFICANT CHANGE UP
MCHC RBC-ENTMCNC: 28.6 PG — SIGNIFICANT CHANGE UP (ref 27–34)
MCHC RBC-ENTMCNC: 33.3 GM/DL — SIGNIFICANT CHANGE UP (ref 32–36)
MCV RBC AUTO: 85.9 FL — SIGNIFICANT CHANGE UP (ref 80–100)
MICROCYTES BLD QL: SLIGHT — SIGNIFICANT CHANGE UP
MONOCYTES # BLD AUTO: 0.09 K/UL — SIGNIFICANT CHANGE UP (ref 0–0.9)
MONOCYTES NFR BLD AUTO: 2.6 % — SIGNIFICANT CHANGE UP (ref 2–14)
NEUTROPHILS # BLD AUTO: 1.89 K/UL — SIGNIFICANT CHANGE UP (ref 1.8–7.4)
NEUTROPHILS NFR BLD AUTO: 52.6 % — SIGNIFICANT CHANGE UP (ref 43–77)
NEUTS BAND # BLD: 1.8 % — SIGNIFICANT CHANGE UP (ref 0–8)
OVALOCYTES BLD QL SMEAR: SLIGHT — SIGNIFICANT CHANGE UP
PLAT MORPH BLD: NORMAL — SIGNIFICANT CHANGE UP
PLATELET # BLD AUTO: 218 K/UL — SIGNIFICANT CHANGE UP (ref 150–400)
POIKILOCYTOSIS BLD QL AUTO: SLIGHT — SIGNIFICANT CHANGE UP
POLYCHROMASIA BLD QL SMEAR: SLIGHT — SIGNIFICANT CHANGE UP
POTASSIUM SERPL-MCNC: 3.7 MMOL/L — SIGNIFICANT CHANGE UP (ref 3.5–5.3)
POTASSIUM SERPL-SCNC: 3.7 MMOL/L — SIGNIFICANT CHANGE UP (ref 3.5–5.3)
PROTHROM AB SERPL-ACNC: 11.3 SEC — SIGNIFICANT CHANGE UP (ref 10.6–13.6)
RBC # BLD: 4.05 M/UL — SIGNIFICANT CHANGE UP (ref 3.8–5.2)
RBC # FLD: 12.1 % — SIGNIFICANT CHANGE UP (ref 10.3–14.5)
RBC BLD AUTO: ABNORMAL
SODIUM SERPL-SCNC: 140 MMOL/L — SIGNIFICANT CHANGE UP (ref 135–145)
VARIANT LYMPHS # BLD: 1.8 % — SIGNIFICANT CHANGE UP (ref 0–6)
WBC # BLD: 3.47 K/UL — LOW (ref 3.8–10.5)
WBC # FLD AUTO: 3.47 K/UL — LOW (ref 3.8–10.5)

## 2021-02-26 PROCEDURE — 84703 CHORIONIC GONADOTROPIN ASSAY: CPT

## 2021-02-26 PROCEDURE — 85730 THROMBOPLASTIN TIME PARTIAL: CPT

## 2021-02-26 PROCEDURE — 93005 ELECTROCARDIOGRAM TRACING: CPT

## 2021-02-26 PROCEDURE — C1887: CPT

## 2021-02-26 PROCEDURE — C1769: CPT

## 2021-02-26 PROCEDURE — 93010 ELECTROCARDIOGRAM REPORT: CPT

## 2021-02-26 PROCEDURE — 83735 ASSAY OF MAGNESIUM: CPT

## 2021-02-26 PROCEDURE — 84702 CHORIONIC GONADOTROPIN TEST: CPT

## 2021-02-26 PROCEDURE — 99153 MOD SED SAME PHYS/QHP EA: CPT

## 2021-02-26 PROCEDURE — 36415 COLL VENOUS BLD VENIPUNCTURE: CPT

## 2021-02-26 PROCEDURE — 85610 PROTHROMBIN TIME: CPT

## 2021-02-26 PROCEDURE — 99152 MOD SED SAME PHYS/QHP 5/>YRS: CPT

## 2021-02-26 PROCEDURE — C1894: CPT

## 2021-02-26 PROCEDURE — 93458 L HRT ARTERY/VENTRICLE ANGIO: CPT

## 2021-02-26 PROCEDURE — 80048 BASIC METABOLIC PNL TOTAL CA: CPT

## 2021-02-26 PROCEDURE — 85025 COMPLETE CBC W/AUTO DIFF WBC: CPT

## 2021-02-26 PROCEDURE — 93458 L HRT ARTERY/VENTRICLE ANGIO: CPT | Mod: 26

## 2021-02-26 RX ORDER — METFORMIN HYDROCHLORIDE 850 MG/1
1 TABLET ORAL
Qty: 0 | Refills: 0 | DISCHARGE

## 2021-02-26 RX ORDER — LISINOPRIL/HYDROCHLOROTHIAZIDE 10-12.5 MG
1 TABLET ORAL
Qty: 0 | Refills: 0 | DISCHARGE

## 2021-02-26 RX ORDER — LEVOTHYROXINE SODIUM 125 MCG
1 TABLET ORAL
Qty: 0 | Refills: 0 | DISCHARGE

## 2021-02-26 NOTE — DISCHARGE NOTE PROVIDER - NSDCMRMEDTOKEN_GEN_ALL_CORE_FT
anastrozole 1 mg oral tablet: 1 tab(s) orally once a day  aspirin 81 mg oral tablet: 1 tab(s) orally once a day  CALCIUM gummies: 1 dose(s) orally once a day  lisinopril-hydrochlorothiazide 10 mg-12.5 mg oral tablet: 1 tab(s) orally once a day  metFORMIN 500 mg oral tablet: 1 tab(s) orally once a day at lunch. RESTART ON MARCH 1, 2020  Metoprolol Succinate ER 25 mg oral tablet, extended release: 1 tab(s) orally once a day (at bedtime)  Synthroid 100 mcg (0.1 mg) oral tablet: 1 tab(s) orally 2 times a week ON SATURDAY AND SUNDAY  Synthroid 88 mcg (0.088 mg) oral tablet: 1 tab(s) orally 5 times a week MONDAY THRU FRIDAY  venlafaxine 75 mg oral capsule, extended release: 1 cap(s) orally once a day (at bedtime)

## 2021-02-26 NOTE — PROGRESS NOTE ADULT - ASSESSMENT
45y Female   Procedure: Left heart catheterization  Pre-op diagnosis: Stable angina  Post-op diagnosis: Normal coronaries    1. Remove radial band at: 2:00PM    2. If OK, discharge home at: 3:00PM    3. Follow up as an outpatient with: Dr. Killian    4. Continue current medications

## 2021-02-26 NOTE — DISCHARGE NOTE NURSING/CASE MANAGEMENT/SOCIAL WORK - PATIENT PORTAL LINK FT
You can access the FollowMyHealth Patient Portal offered by [facility] by registering at the following website: [email or web address]. By joining [facility]’s FollowMyHealth portal, you will also be able to view your health information using other applications (apps) compatible with our system.

## 2021-02-26 NOTE — DISCHARGE NOTE PROVIDER - CARE PROVIDER_API CALL
Ciro Killian)  Cardiovascular Disease; Internal Medicine  [address] [place] [address]  Phone: [phone]  Fax: [phone]  Established Patient  Follow Up Time: Routine

## 2021-02-26 NOTE — ASU PATIENT PROFILE, ADULT - PMH
Anemia    Carpal tunnel syndrome of right wrist    Endometriosis, uterus    Essential hypertension    Hyperlipidemia, unspecified hyperlipidemia type    Malignant neoplasm of left female breast, unspecified estrogen receptor status, unspecified site of    Malignant neoplasm of thyroid gland    Postoperative hypothyroidism    Prediabetes    Rheumatic fever

## 2021-02-26 NOTE — PROGRESS NOTE ADULT - SUBJECTIVE AND OBJECTIVE BOX
Department of Cardiology                                                                  [facility] [address] [id number]                                                            Telephone: [phone]. Fax:[phone]                                                                           Cardiac Catheterization Note       Subjective:  45y  Female who had a left heart catheterization which showed:  VENTRICLES: There were no left ventricular global or regional wall motion abnormalities. Global left ventricular function was normal. EF calculated by contrast ventriculography was65 %.  VALVES: AORTIC VALVE: The aortic valve was evaluated by left ventriculography. The aortic valve appeared to be structurally normal. The aortic valve leaflets exhibited normal thickness and normal excursion. There was no aortic stenosis. MITRAL VALVE: The mitral valve was evaluated by left ventriculography. The mitral valve appeared grossly normal. The mitral leaflets exhibited normal thickness and normal excursion. The mitral valve exhibited no regurgitation.  CORONARY VESSELS: The coronary circulation is left dominant.  LM:     --  LM: The vessel was normal sized, not calcified, and not tortuous. Angiography showed no evidence of disease.  LAD:     --  LAD: The vessel was normal sized, not calcified, and not tortuous. Angiography showed no evidence of disease.  CX:     --  Circumflex: The vessel was large sized (dominant), not calcified, and not tortuous. Angiography showed no evidence of disease.  RCA:     --  RCA: The vessel was small (non-dominant), not calcified, and not tortuous. Angiography showed no evidence of disease.         Access: Right radial       Hemostasis: Radial band       Total Contrast: 52 mL Omnipaque    PAST MEDICAL & SURGICAL HISTORY:  COVID-19 virus infection  Malignant neoplasm of left female breast, unspecified estrogen receptor status, unspecified site of  Rheumatic fever  Carpal tunnel syndrome of right wrist  Postoperative hypothyroidism  Prediabetes  Hyperlipidemia, unspecified hyperlipidemia type  Essential hypertension  Anemia  Endometriosis, uterus  Malignant neoplasm of thyroid gland  History of thyroidectomy  History of lumpectomy of left breast  History of total abdominal hysterectomy and bilateral salpingo-oophorectomy  R Kidney Stent Placed then removed: due to kidney infection as per pt  History of umbilical hernia repair: pt states hernia has retured  History of dilatation and curettage: for endometriosis    FAMILY HISTORY:  Family history of kidney disease (Mother)  Family history of hypertension (Mother)  Family history of diabetes mellitus (Mother)    Home Medications:  anastrozole 1 mg oral tablet: 1 tab(s) orally once a day (26 Feb 2021 09:28)  aspirin 81 mg oral tablet: 1 tab(s) orally once a day (26 Feb 2021 09:28)  CALCIUM gummies: 1 dose(s) orally once a day (26 Feb 2021 09:28)  lisinopril-hydrochlorothiazide 10 mg-12.5 mg oral tablet: 1 tab(s) orally once a day (26 Feb 2021 09:28)  metFORMIN 500 mg oral tablet: 1 tab(s) orally once a day at lunch. RESTART ON MARCH 1, 2020 (26 Feb 2021 12:10)  Metoprolol Succinate ER 25 mg oral tablet, extended release: 1 tab(s) orally once a day (at bedtime) (26 Feb 2021 09:28)  Synthroid 100 mcg (0.1 mg) oral tablet: 1 tab(s) orally 2 times a week ON SATURDAY AND SUNDAY (26 Feb 2021 09:28)  Synthroid 88 mcg (0.088 mg) oral tablet: 1 tab(s) orally 5 times a week MONDAY THRU FRIDAY (26 Feb 2021 09:28)  venlafaxine 75 mg oral capsule, extended release: 1 cap(s) orally once a day (at bedtime) (26 Feb 2021 09:28)    HPI: 46y/o female never smoker with history of HLD, HTN, prediabetes, COVID 19 infection in March, 2020, left breast cancer (radiation and lumpectomy), thyroid cancer, S/P thyroidectomy, and rheumatic fever as a child, who had an abnormal EKG (T wave inversions in V1-4). She was referred to cardiology and had a nuclear stress test which showed a small to moderate sized, moderate intensity, significant partial reversible defect of the mid apical anterior and apical walls consistent with ischemia and breast attenuation artifact. She does admit to [facility] (she states she got SOB walking from waiting room to her stretcher), occasional palpitations, BLE edema, and nocturnal cough. She denies chest pain, orthopnea, PND, nausea, fatigue or syncope/near syncope.    General: No fatigue, no fevers/chills  Respiratory: No dyspnea, no cough, no wheeze  CV: No chest pain, no palpitations  Abd: No nausea  Neuro: No headache, no dizziness    Allergies  ·	No Known Drug Allergies  ·	peanuts (Other (Moderate))  ·	Sesame (Other (Moderate))    Objective:  Vital Signs Last 24 Hrs  T(C): 36.7 (26 Feb 2021 09:16), Max: 36.7 (26 Feb 2021 09:16)  T(F): 98.1 (26 Feb 2021 09:16), Max: 98.1 (26 Feb 2021 09:16)  HR: 58 (26 Feb 2021 09:16) (58 - 58)  BP: 139/84 (26 Feb 2021 09:16) (139/84 - 139/84)  RR: 18 (26 Feb 2021 09:16) (18 - 18)  SpO2: 99% (26 Feb 2021 09:16) (99% - 99%)    CM: SR  Neuro: A&OX3, CN 2-12 intact  HEENT: NC, AT  Lungs: CTA B/L  CV: S1, S2, no murmur, RRR  Abd: Soft  Extremity: Right radial band: no bleeding, fingers warm with good cap refil                          11.6   3.47  )-----------( 218      ( 26 Feb 2021 08:47 )             34.8     02-26    140  |  104  |  28.0  ----------------------------<  103  3.7   |  26.0  |  0.70    Ca    9.1      26 Feb 2021 08:47  Mg     1.9     02-26    PT/INR - ( 26 Feb 2021 08:47 )   PT: 11.3 sec;   INR: 0.97 ratio    PTT - ( 26 Feb 2021 08:47 )  PTT:31.8 sec

## 2021-02-26 NOTE — DISCHARGE NOTE PROVIDER - INSTRUCTIONS
Choose lean meats and poultry without skin and prepare them without added saturated and trans fat. Eat fish at least twice a week.  Select fat-free, 1 percent fat and low-fat dairy products.  Cut back on foods containing partially hydrogenated vegetable oils to reduce trans fat in your diet. To lower cholesterol, reduce saturated fat to no more than 5 to 6 percent of total calories. For someone eating 2,000 calories a day, that’s about 13 grams of saturated fat.  Cut back on beverages and foods with added sugars.  Choose and prepare foods with little or no salt. To lower blood pressure, aim to eat no more than 2,400 milligrams of sodium per day. Reducing daily intake to 1,500 mg is desirable because it can lower blood pressure even further.  If you drink alcohol, drink in moderation. That means one drink per day if you’re a woman and two drinks  per day if you’re a man.

## 2021-02-26 NOTE — DISCHARGE NOTE PROVIDER - NSDCCPCAREPLAN_GEN_ALL_CORE_FT
PRINCIPAL DISCHARGE DIAGNOSIS  Diagnosis: False positive stress test  Assessment and Plan of Treatment:

## 2021-02-26 NOTE — DISCHARGE NOTE PROVIDER - NSDCFUADDINST_GEN_ALL_CORE_FT
Restricted use with no heavy lifting of affected arm for 48 hours.  No submerging the arm in water for 48 hours.  You may start showering today.  Call your doctor for any bleeding, swelling, loss of sensation in the hand or fingers, or fingers turning blue.  If heavy bleeding or large lumps form, hold pressure at the spot and come to the Emergency Room.

## 2021-02-26 NOTE — DISCHARGE NOTE PROVIDER - HOSPITAL COURSE
44y/o female never smoker with history of HLD, HTN, prediabetes, COVID 19 infection in March, 2020, left breast cancer (radiation and lumpectomy), thyroid cancer, S/P thyroidectomy, and rheumatic fever as a child, who had an abnormal EKG (T wave inversions in V1-4). She was referred to cardiology and had a nuclear stress test which showed a small to moderate sized, moderate intensity, significant partial reversible defect of the mid apical anterior and apical walls consistent with ischemia and breast attenuation artifact. She does admit to [facility] (she states she got SOB walking from waiting room to her stretcher), occasional palpitations, BLE edema, and nocturnal cough. She denies chest pain, orthopnea, PND, nausea, fatigue or syncope/near syncope. She had a left heart catheterization which showed:  VENTRICLES: There were no left ventricular global or regional wall motion abnormalities. Global left ventricular function was normal. EF calculated by contrast ventriculography was65 %.  VALVES: AORTIC VALVE: The aortic valve was evaluated by left ventriculography. The aortic valve appeared to be structurally normal. The aortic valve leaflets exhibited normal thickness and normal excursion. There was no aortic stenosis. MITRAL VALVE: The mitral valve was evaluated by left ventriculography. The mitral valve appeared grossly normal. The mitral leaflets exhibited normal thickness and normal excursion. The mitral valve exhibited no regurgitation.  CORONARY VESSELS: The coronary circulation is left dominant.  LM:     --  LM: The vessel was normal sized, not calcified, and not tortuous. Angiography showed no evidence of disease.  LAD:     --  LAD: The vessel was normal sized, not calcified, and not tortuous. Angiography showed no evidence of disease.  CX:     --  Circumflex: The vessel was large sized (dominant), not calcified, and not tortuous. Angiography showed no evidence of disease.  RCA:     --  RCA: The vessel was small (non-dominant), not calcified, and not tortuous. Angiography showed no evidence of disease.    Plan:   Medications: Continue current medications    Follow up: Dr. Killian    Diagnostics: N/A

## 2021-02-26 NOTE — ASU PATIENT PROFILE, ADULT - TEACHING/LEARNING FACTORS INFLUENCE READINESS TO LEARN
none pt stated she start having pain this morning located in th suprapubic area radiates to the right side

## 2021-02-26 NOTE — ASU PATIENT PROFILE, ADULT - PSH
History of dilatation and curettage  for endometrosis    History of lumpectomy of left breast    History of thyroidectomy    History of total abdominal hysterectomy and bilateral salpingo-oophorectomy    History of umbilical hernia repair  pt states hernia has retured  R Kidney Stent Placed then removed  due to kidney infection as per pt

## 2021-03-01 ENCOUNTER — NON-APPOINTMENT (OUTPATIENT)
Age: 46
End: 2021-03-01

## 2021-03-01 PROBLEM — G56.01 CARPAL TUNNEL SYNDROME, RIGHT UPPER LIMB: Chronic | Status: ACTIVE | Noted: 2021-02-26

## 2021-03-01 PROBLEM — I00 RHEUMATIC FEVER WITHOUT HEART INVOLVEMENT: Chronic | Status: ACTIVE | Noted: 2021-02-26

## 2021-03-01 PROBLEM — C50.912 MALIGNANT NEOPLASM OF UNSPECIFIED SITE OF LEFT FEMALE BREAST: Chronic | Status: ACTIVE | Noted: 2021-02-26

## 2021-03-01 PROBLEM — R73.03 PREDIABETES: Chronic | Status: ACTIVE | Noted: 2021-02-26

## 2021-03-01 PROBLEM — E89.0 POSTPROCEDURAL HYPOTHYROIDISM: Chronic | Status: ACTIVE | Noted: 2021-02-26

## 2021-03-01 PROBLEM — I10 ESSENTIAL (PRIMARY) HYPERTENSION: Chronic | Status: ACTIVE | Noted: 2021-02-26

## 2021-03-01 PROBLEM — E78.5 HYPERLIPIDEMIA, UNSPECIFIED: Chronic | Status: ACTIVE | Noted: 2021-02-26

## 2021-03-01 RX ORDER — METFORMIN HYDROCHLORIDE 850 MG/1
1 TABLET ORAL
Qty: 0 | Refills: 0 | DISCHARGE
Start: 2021-03-01

## 2021-03-12 ENCOUNTER — APPOINTMENT (OUTPATIENT)
Dept: CARDIOLOGY | Facility: CLINIC | Age: 46
End: 2021-03-12
Payer: MEDICAID

## 2021-03-12 VITALS
TEMPERATURE: 97.5 F | BODY MASS INDEX: 30.36 KG/M2 | WEIGHT: 165 LBS | HEIGHT: 62 IN | RESPIRATION RATE: 16 BRPM | DIASTOLIC BLOOD PRESSURE: 78 MMHG | HEART RATE: 52 BPM | SYSTOLIC BLOOD PRESSURE: 130 MMHG

## 2021-03-12 PROCEDURE — 93000 ELECTROCARDIOGRAM COMPLETE: CPT

## 2021-03-12 PROCEDURE — 99072 ADDL SUPL MATRL&STAF TM PHE: CPT

## 2021-03-12 PROCEDURE — 99214 OFFICE O/P EST MOD 30 MIN: CPT

## 2021-03-12 RX ORDER — MIRABEGRON 25 MG/1
25 TABLET, FILM COATED, EXTENDED RELEASE ORAL DAILY
Qty: 30 | Refills: 6 | Status: DISCONTINUED | COMMUNITY
Start: 2020-11-09 | End: 2021-03-12

## 2021-03-12 RX ORDER — VALACYCLOVIR 500 MG/1
500 TABLET, FILM COATED ORAL
Qty: 10 | Refills: 3 | Status: DISCONTINUED | COMMUNITY
Start: 2020-10-22 | End: 2021-03-12

## 2021-03-12 RX ORDER — OXYBUTYNIN CHLORIDE 15 MG/1
15 TABLET, EXTENDED RELEASE ORAL
Qty: 30 | Refills: 9 | Status: DISCONTINUED | COMMUNITY
Start: 2021-01-29 | End: 2021-03-12

## 2021-03-15 NOTE — HISTORY OF PRESENT ILLNESS
[FreeTextEntry1] : From a cardiac standpoint, Mrs. Christiansen denies exertional chest pain, shortness of breath, or other cardiac symptoms.  She remains reasonably stable.  She recent underwent a diagnostic cardiac catheterization following an abnormal nuclear stress test which failed to reveal evidence of coronary artery disease.

## 2021-03-15 NOTE — ASSESSMENT
[FreeTextEntry1] : 1.  EKG today reveals sinus bradycardia at 52 bpm.  RSr' leads V1, V2.  No ischemic changes. \par \par 2.  Recent EKG changes with abnormal nuclear stress test suggesting anteroapical ischemia:  Patient status-post cardiac catheterization which failed to reveal evidence of coronary artery disease.  At this time, she is completely asymptomatic and looking forward to beginning a gradual exercise program as well as following a low-fat / low-cholesterol diet in order to lose weight. \par \par 3.  Hypertension:  Blood pressure under reasonable control at this time on current medications. \par \par 4.  Hyperlipidemia:  Patient with recent bloodwork demonstrating a total cholesterol of 220 as well as an LDL of 145 and an HDL in the mid-50s.  Patient advised on a strict low-fat / low-cholesterol diet and follow up with her PCP regarding bloodwork.  If clinically stable from a cardiac workup, follow up office visit here 12 months.   \par

## 2021-03-15 NOTE — REASON FOR VISIT
[FreeTextEntry1] : Mrs. Christiansen is a delightful 45-year-old  female, native of [place], with a past medical history significant for hyperlipidemia, non-obstructive coronary artery disease, thyroid cancer status-post total thyroidectomy, and hypertension, who presents for follow up evaluation. \par \par

## 2021-03-15 NOTE — PHYSICAL EXAM
[General Appearance - Well Developed] : well developed [General Appearance - In No Acute Distress] : no acute distress [Normal Conjunctiva] : the conjunctiva exhibited no abnormalities [Normal Oral Mucosa] : normal oral mucosa [Auscultation Breath Sounds / Voice Sounds] : lungs were clear to auscultation bilaterally [Heart Rate And Rhythm] : heart rate and rhythm were normal [Murmurs] : no murmurs present [Arterial Pulses Normal] : the arterial pulses were normal [Bowel Sounds] : normal bowel sounds [Abnormal Walk] : normal gait [Skin Color & Pigmentation] : normal skin color and pigmentation [Skin Turgor] : normal skin turgor [Oriented To Time, Place, And Person] : oriented to person, place, and time [Affect] : the affect was normal [Mood] : the mood was normal [FreeTextEntry1] : No edema

## 2021-04-08 PROBLEM — Z63.4 WIDOW: Status: ACTIVE | Noted: 2021-04-08

## 2021-04-08 RX ORDER — LEVOTHYROXINE SODIUM 0.1 MG/1
100 TABLET ORAL
Refills: 0 | Status: DISCONTINUED | COMMUNITY
End: 2021-04-08

## 2021-04-09 ENCOUNTER — RESULT CHARGE (OUTPATIENT)
Age: 46
End: 2021-04-09

## 2021-04-09 ENCOUNTER — APPOINTMENT (OUTPATIENT)
Dept: UROGYNECOLOGY | Facility: CLINIC | Age: 46
End: 2021-04-09
Payer: MEDICAID

## 2021-04-09 DIAGNOSIS — Z63.4 DISAPPEARANCE AND DEATH OF FAMILY MEMBER: ICD-10-CM

## 2021-04-09 LAB
BILIRUB UR QL STRIP: NEGATIVE
CLARITY UR: CLEAR
COLLECTION METHOD: NORMAL
GLUCOSE UR-MCNC: NEGATIVE
HCG UR QL: 0.2 EU/DL
HGB UR QL STRIP.AUTO: NEGATIVE
KETONES UR-MCNC: NEGATIVE
LEUKOCYTE ESTERASE UR QL STRIP: NEGATIVE
NITRITE UR QL STRIP: NEGATIVE
PH UR STRIP: 5.5
PROT UR STRIP-MCNC: NEGATIVE
SP GR UR STRIP: 1.03

## 2021-04-09 PROCEDURE — 51701 INSERT BLADDER CATHETER: CPT

## 2021-04-09 PROCEDURE — 99204 OFFICE O/P NEW MOD 45 MIN: CPT | Mod: 25

## 2021-04-09 PROCEDURE — 99072 ADDL SUPL MATRL&STAF TM PHE: CPT

## 2021-04-09 SDOH — SOCIAL STABILITY - SOCIAL INSECURITY: DISSAPEARANCE AND DEATH OF FAMILY MEMBER: Z63.4

## 2021-04-09 NOTE — PHYSICAL EXAM
[Chaperone Present] : A chaperone was present in the examining room during all aspects of the physical examination [No Acute Distress] : in no acute distress [Oriented x3] : oriented to person, place, and time [Cough] : no cough [No Edema] : ~T edema was not present [Symmetrical] : the neck was ~L symmetrical [Tenderness] : ~T no ~M abdominal tenderness observed [Distended] : not distended [None] : no CVA tenderness [Inguinal LAD] : no adenopathy was noted in the inguinal lymph nodes [Warm and Dry] : was warm and dry to touch [Normal Gait] : gait was normal [Labia Majora] : were normal [Labia Minora] : were normal [Bartholin's Gland] : both Bartholin's glands were normal  [Normal Appearance] : general appearance was normal [No Bleeding] : there was no active vaginal bleeding [Normal] : was normal [2] : 2 [Aa ____] : Aa [unfilled] [Ba ____] : Ba [unfilled] [C ____] : C [unfilled] [GH ____] : GH [unfilled] [PB ____] : PB [unfilled] [TVL ____] : TVL  [unfilled] [Ap ____] : Ap [unfilled] [Bp ____] : Bp [unfilled] [] : 0 [Post Void Residual ____ml] : post void residual was [unfilled] ml [Exam Deferred] : was deferred [FreeTextEntry3] : empty supine cst neg, no urethral hypermobility appreciated [FreeTextEntry4] : no mesh exposure, palpable TOT subepithelially b/l anterolat sulci; no other mass lesion or cyst; nontender nonbanded levators [de-identified] : no appreciable mass or lesion

## 2021-04-09 NOTE — REASON FOR VISIT
[Initial Visit ___] : an initial visit for [unfilled] [Urinary Incontinence] : urinary incontinence [Questionnaire Received] : Patient questionnaire received [Urine Frequency] : urine frequency [Urinary Urgency] : urinary urgency [Nocturia] : nocturia [Poor/Slow Urine Flow] : poor/slow urine flow [Pelvic Pain] : pelvic pain

## 2021-04-09 NOTE — ASSESSMENT
[FreeTextEntry1] : Bekah is a pleasant 46 yo P0, PSHx includes RTLH/USO/TOT, USO, and thyroidectomy, hx thyroid cancer and now breast cancer, presents with bothersome MATILDE, OAB-wet > MAMADOU. Unclear if OAB present prior to TOT. On exam, her empty supine CST was neg and she did not have positive urethral hypermobility. There was no vag mesh exposure, and exam findings c/w likely prior TOT. Her straight-cath PVR volume was normal. On pelvic exam, she had a positive bulbo reflex. There were no appreciable masses, cysts, or lesions. Pelvic floor muscle contraction strength was present but weak. There was no levator or pelvic floor musculature banding, tightness, or tenderness. POPQ exam did not demonstrate clinically significant pelvic organ prolapse. \par \par The patient has symptoms consistent with stress urinary incontinence. The etiology of MAMADOU was discussed. Management options including observation, behavioral modifications, medication, pessary, Impressa insert, periurethral bulking via cystoscopy, and surgery with midurethral sling were reviewed. Other anti-incontinence procedures such as a Lange or fascial sling also reviewed.\par \par The patient has urinary symptoms consistent with overactive bladder. The etiology of OAB was discussed. Management options including observation, behavioral modifications (dietary changes, monitoring fluid intake, bladder training, timed voids, use of pads/protective garments), kegels, PT, medications, PTNS, SNS, and bladder Botox were all reviewed.\par \par We discussed prior TOT to treat MAMADOU and not OAB. We discussed UDS to eval further, triage MATILDE managemnet, and eval for YI/detrusor funciton. Triage OAB management therefater. She undrestood and agreed, all ques answered.

## 2021-04-09 NOTE — OB HISTORY
[Total Preg ___] : : [unfilled] [Definite ___ (Date)] : the last menstrual period was [unfilled] [Last Pap Smear ___] : date of last pap smear was on [unfilled] [Abnormal Pap Smear] : normal pap smear [Taking Estrogens] : is not taking estrogen replacement [Abnormal Bleeding] : without bleeding [Sexually Active] : is not sexually active

## 2021-04-09 NOTE — HISTORY OF PRESENT ILLNESS
[FreeTextEntry1] : Patient reports years of bothersome leakage of urine, currently using pads. Leakage with urgency most bothersome, and freq urge nocturia 1. No dysuria, no UTIs, no gross hematuria, no flank pain. Also with leakage with cough sneeze x 3 years. Prior TOT sling done around 2013 with improvement of MAMADOU symptoms for years until about 3 years ago. Unsure if urgency was present prior to TOT or not. No bulge or pressure in vagina. Normal bowel movements without straining or constipation. Has tried myrbetriq which helped but stoppeda fter a month due to cost. Tried an anticholinergic which she had to DC secondary to worsening constipatoin and dry mouth, minimal if any improvement. REports hx dyspareunia secondary to endometriosis, rare and not currently SA.\par \par PSH includes partial hysterectomy\par PMH includes obesity, breast cancer, thyroid cancer, HTN, endometriosis\par Never a tobacco smoker

## 2021-04-09 NOTE — PROCEDURE
[Straight Catheterization] : insertion of a straight catheter [Stress Incontinence] : stress incontinence [Urgent Incontinence] : urgent incontinence [Urinary Frequency] : urinary frequency [Patient] : the patient [___ Fr Straight Tip] : a [unfilled] in [place] straight tip catheter [None] : there were no complications with the catheter insertion [Clear] : clear [No Complications] : no complications [Tolerated Well] : the patient tolerated the procedure well [Post procedure instructions and information given] : Post procedure instructions and information were given and reviewed with patient. [1] : 1 [FreeTextEntry1] : cathed to obtain pvr and uncontam specimen

## 2021-04-19 ENCOUNTER — APPOINTMENT (OUTPATIENT)
Dept: UROGYNECOLOGY | Facility: CLINIC | Age: 46
End: 2021-04-19

## 2021-04-19 ENCOUNTER — APPOINTMENT (OUTPATIENT)
Dept: UROGYNECOLOGY | Facility: CLINIC | Age: 46
End: 2021-04-19
Payer: MEDICAID

## 2021-04-19 PROCEDURE — 51729 CYSTOMETROGRAM W/VP&UP: CPT

## 2021-04-19 PROCEDURE — 51797 INTRAABDOMINAL PRESSURE TEST: CPT

## 2021-04-19 PROCEDURE — 99072 ADDL SUPL MATRL&STAF TM PHE: CPT

## 2021-04-19 PROCEDURE — 51741 ELECTRO-UROFLOWMETRY FIRST: CPT

## 2021-04-19 PROCEDURE — 51784 ANAL/URINARY MUSCLE STUDY: CPT

## 2021-05-21 ENCOUNTER — APPOINTMENT (OUTPATIENT)
Dept: UROGYNECOLOGY | Facility: CLINIC | Age: 46
End: 2021-05-21
Payer: MEDICAID

## 2021-05-21 PROCEDURE — 99213 OFFICE O/P EST LOW 20 MIN: CPT | Mod: 25

## 2021-05-21 PROCEDURE — 52000 CYSTOURETHROSCOPY: CPT

## 2021-07-01 ENCOUNTER — APPOINTMENT (OUTPATIENT)
Dept: ORTHOPEDIC SURGERY | Facility: CLINIC | Age: 46
End: 2021-07-01

## 2021-07-02 ENCOUNTER — APPOINTMENT (OUTPATIENT)
Dept: UROGYNECOLOGY | Facility: CLINIC | Age: 46
End: 2021-07-02
Payer: MEDICAID

## 2021-07-02 DIAGNOSIS — E66.9 OBESITY, UNSPECIFIED: ICD-10-CM

## 2021-07-02 PROCEDURE — 99214 OFFICE O/P EST MOD 30 MIN: CPT

## 2021-07-02 NOTE — ASSESSMENT
[FreeTextEntry1] : MATILDE, GSUI. Prior TOT.\par \par We discussed the placement of a mid-urethral sling. Discussed that this is a repeat midurethral sling and that she would benefit most from a retropubic midurethral sling. Risks and benefits of a TOT sling versus a TVT vs mini sling routes were discussed including bladder and bowel perforation, vascular injury and hemorrhage, voiding dysfunction, UTIs, dyspareunia, and groin pain. The risks and benefits of the procedure were discussed and included but were not limited to risks of anesthesia, cardiopulmonary arrest, stroke, MI, bleeding, hemorrhage, transfusion, hematoma formation, blood clot formation, infection, failure, mesh erosion or exposure, fistula formation, chronic pain, dyspareunia, damage to surrounding organs such as bladder / bowel / ureters / rectum / nerves / vessels, UTIs, voiding dysfunction, urinary retention, need for further surgery, and new onset / persistent / or worsening OAB symptoms. The FDA notifications regarding the use of vaginal mesh were reviewed and the website was provided to refer to. We discussed the possibility of going home with a catheter. Hospital stay, anesthesia, and postoperative restrictions were discussed. She understood all counseling. All questions were answered and she wishes to proceed with surgical correction. Consent was signed and witnessed in the office.\par \par Plan:\par [] book TVT MUS / cysto / other indicated procedures at [facility] (consider prior TOT release at time of procedure)\par [] med clearance\par [] PSTs\par \par

## 2021-07-02 NOTE — HISTORY OF PRESENT ILLNESS
[FreeTextEntry1] : 44 yo P0, PSHx includes RTLH/USO/TOT, USO, and thyroidectomy, hx thyroid cancer and now breast cancer, with bothersome MATILDE, OAB and MAMADOU, she is unsure which is worse. On questioning, leakage of urine known with cough sneeze, and positional changes primiarly - unclear if act of standing with full bladder vs urge, but leakage with cough sneeze is aparent. On exam, normal PVR, no mesh exposure, no POP. \par \par UDS: delayed first sensation, normal PVR, no DO, +GSUI, normal MUCPs. Pdet during void max was over 50 cm. Unlikely YI.\par \par Cystourethroscopy normal. \par \par Presents for surg discussion. OAB vs MAMADOU reviewed, pathophys, and tx options. OAB txs intended to treat OAB and not MAMADOU reviewed, and vice versa. She desires surg correction of UI and declines nonsurg intervention such as observation, behavioral modifications, medication, pessary, Impressa insert, periurethral bulking via cystoscopy; PTNS, PNE/SNS, or bladder botox injections. Other anti-incontinence procedures such as a Lange or fascial sling also reviewed.\par \par

## 2021-07-29 ENCOUNTER — APPOINTMENT (OUTPATIENT)
Dept: ORTHOPEDIC SURGERY | Facility: CLINIC | Age: 46
End: 2021-07-29
Payer: MEDICAID

## 2021-07-29 VITALS
BODY MASS INDEX: 31.28 KG/M2 | WEIGHT: 170 LBS | SYSTOLIC BLOOD PRESSURE: 147 MMHG | HEART RATE: 59 BPM | DIASTOLIC BLOOD PRESSURE: 85 MMHG | HEIGHT: 62 IN

## 2021-07-29 PROCEDURE — 99204 OFFICE O/P NEW MOD 45 MIN: CPT

## 2021-08-17 ENCOUNTER — OUTPATIENT (OUTPATIENT)
Dept: OUTPATIENT SERVICES | Facility: HOSPITAL | Age: 46
LOS: 1 days | End: 2021-08-17
Payer: COMMERCIAL

## 2021-08-17 VITALS
HEART RATE: 73 BPM | OXYGEN SATURATION: 98 % | DIASTOLIC BLOOD PRESSURE: 86 MMHG | SYSTOLIC BLOOD PRESSURE: 150 MMHG | HEIGHT: 63 IN | TEMPERATURE: 98 F | RESPIRATION RATE: 16 BRPM | WEIGHT: 169.98 LBS

## 2021-08-17 DIAGNOSIS — Z01.818 ENCOUNTER FOR OTHER PREPROCEDURAL EXAMINATION: ICD-10-CM

## 2021-08-17 DIAGNOSIS — R73.03 PREDIABETES: ICD-10-CM

## 2021-08-17 DIAGNOSIS — Z98.890 OTHER SPECIFIED POSTPROCEDURAL STATES: Chronic | ICD-10-CM

## 2021-08-17 DIAGNOSIS — Z90.710 ACQUIRED ABSENCE OF BOTH CERVIX AND UTERUS: Chronic | ICD-10-CM

## 2021-08-17 DIAGNOSIS — E89.0 POSTPROCEDURAL HYPOTHYROIDISM: Chronic | ICD-10-CM

## 2021-08-17 DIAGNOSIS — N39.3 STRESS INCONTINENCE (FEMALE) (MALE): ICD-10-CM

## 2021-08-17 LAB
A1C WITH ESTIMATED AVERAGE GLUCOSE RESULT: 6.4 % — HIGH (ref 4–5.6)
ANION GAP SERPL CALC-SCNC: 5 MMOL/L — SIGNIFICANT CHANGE UP (ref 5–17)
APPEARANCE UR: ABNORMAL
BILIRUB UR-MCNC: NEGATIVE — SIGNIFICANT CHANGE UP
BUN SERPL-MCNC: 25 MG/DL — HIGH (ref 7–23)
CALCIUM SERPL-MCNC: 9 MG/DL — SIGNIFICANT CHANGE UP (ref 8.5–10.1)
CHLORIDE SERPL-SCNC: 107 MMOL/L — SIGNIFICANT CHANGE UP (ref 96–108)
CO2 SERPL-SCNC: 29 MMOL/L — SIGNIFICANT CHANGE UP (ref 22–31)
COLOR SPEC: YELLOW — SIGNIFICANT CHANGE UP
CREAT SERPL-MCNC: 0.81 MG/DL — SIGNIFICANT CHANGE UP (ref 0.5–1.3)
DIFF PNL FLD: ABNORMAL
ESTIMATED AVERAGE GLUCOSE: 137 MG/DL — HIGH (ref 68–114)
GLUCOSE SERPL-MCNC: 142 MG/DL — HIGH (ref 70–99)
GLUCOSE UR QL: NEGATIVE — SIGNIFICANT CHANGE UP
HCT VFR BLD CALC: 35.4 % — SIGNIFICANT CHANGE UP (ref 34.5–45)
HGB BLD-MCNC: 11.5 G/DL — SIGNIFICANT CHANGE UP (ref 11.5–15.5)
KETONES UR-MCNC: NEGATIVE — SIGNIFICANT CHANGE UP
LEUKOCYTE ESTERASE UR-ACNC: ABNORMAL
MCHC RBC-ENTMCNC: 28.1 PG — SIGNIFICANT CHANGE UP (ref 27–34)
MCHC RBC-ENTMCNC: 32.5 GM/DL — SIGNIFICANT CHANGE UP (ref 32–36)
MCV RBC AUTO: 86.6 FL — SIGNIFICANT CHANGE UP (ref 80–100)
NITRITE UR-MCNC: NEGATIVE — SIGNIFICANT CHANGE UP
NRBC # BLD: 0 /100 WBCS — SIGNIFICANT CHANGE UP (ref 0–0)
PH UR: 6.5 — SIGNIFICANT CHANGE UP (ref 5–8)
PLATELET # BLD AUTO: 219 K/UL — SIGNIFICANT CHANGE UP (ref 150–400)
POTASSIUM SERPL-MCNC: 4.4 MMOL/L — SIGNIFICANT CHANGE UP (ref 3.5–5.3)
POTASSIUM SERPL-SCNC: 4.4 MMOL/L — SIGNIFICANT CHANGE UP (ref 3.5–5.3)
PROT UR-MCNC: NEGATIVE — SIGNIFICANT CHANGE UP
RBC # BLD: 4.09 M/UL — SIGNIFICANT CHANGE UP (ref 3.8–5.2)
RBC # FLD: 12.5 % — SIGNIFICANT CHANGE UP (ref 10.3–14.5)
SODIUM SERPL-SCNC: 141 MMOL/L — SIGNIFICANT CHANGE UP (ref 135–145)
SP GR SPEC: 1.01 — SIGNIFICANT CHANGE UP (ref 1.01–1.02)
UROBILINOGEN FLD QL: NEGATIVE — SIGNIFICANT CHANGE UP
WBC # BLD: 3.88 K/UL — SIGNIFICANT CHANGE UP (ref 3.8–10.5)
WBC # FLD AUTO: 3.88 K/UL — SIGNIFICANT CHANGE UP (ref 3.8–10.5)

## 2021-08-17 PROCEDURE — 87086 URINE CULTURE/COLONY COUNT: CPT

## 2021-08-17 PROCEDURE — 83036 HEMOGLOBIN GLYCOSYLATED A1C: CPT

## 2021-08-17 PROCEDURE — 81001 URINALYSIS AUTO W/SCOPE: CPT

## 2021-08-17 PROCEDURE — 80048 BASIC METABOLIC PNL TOTAL CA: CPT

## 2021-08-17 PROCEDURE — G0463: CPT

## 2021-08-17 PROCEDURE — 93010 ELECTROCARDIOGRAM REPORT: CPT

## 2021-08-17 PROCEDURE — 85027 COMPLETE CBC AUTOMATED: CPT

## 2021-08-17 PROCEDURE — 93005 ELECTROCARDIOGRAM TRACING: CPT

## 2021-08-17 PROCEDURE — 36415 COLL VENOUS BLD VENIPUNCTURE: CPT

## 2021-08-17 RX ORDER — DEXTROSE 50 % IN WATER 50 %
25 SYRINGE (ML) INTRAVENOUS ONCE
Refills: 0 | Status: DISCONTINUED | OUTPATIENT
Start: 2021-08-24 | End: 2021-09-07

## 2021-08-17 RX ORDER — VENLAFAXINE HCL 75 MG
1 CAPSULE, EXT RELEASE 24 HR ORAL
Qty: 0 | Refills: 0 | DISCHARGE

## 2021-08-17 RX ORDER — DEXTROSE 50 % IN WATER 50 %
12.5 SYRINGE (ML) INTRAVENOUS ONCE
Refills: 0 | Status: DISCONTINUED | OUTPATIENT
Start: 2021-08-24 | End: 2021-09-07

## 2021-08-17 RX ORDER — GLUCAGON INJECTION, SOLUTION 0.5 MG/.1ML
1 INJECTION, SOLUTION SUBCUTANEOUS ONCE
Refills: 0 | Status: DISCONTINUED | OUTPATIENT
Start: 2021-08-24 | End: 2021-09-07

## 2021-08-17 NOTE — H&P PST ADULT - HISTORY OF PRESENT ILLNESS
48 yo female HLD, HTN, prediabetes, s/p COVID 19 infection in March, 2020, left breast cancer (radiation and lumpectomy), thyroid cancer, S/P thyroidectomy, and rheumatic fever as a child, presents to PST scheduled for a mid urethral sling - cysto on 8/24 with Dr. Knapp.  Reports urinary frequency and urgency with incontinence. Denies hematuria and dysuria

## 2021-08-17 NOTE — H&P PST ADULT - PROBLEM SELECTOR PLAN 3
A1c pending. Patient advised that (s)he would need endo clearance if A1c came back 9 or above. Hold Metformin for 24 hours. Last dose 8/22. Hold all diabetic meds DOS. Verbalized understanding. Fingerstick am of surgery.

## 2021-08-17 NOTE — H&P PST ADULT - NSICDXPASTMEDICALHX_GEN_ALL_CORE_FT
PAST MEDICAL HISTORY:  Anemia     Carpal tunnel syndrome of right wrist     COVID-19 virus infection March 2020    Endometriosis, uterus     Essential hypertension     Hyperlipidemia, unspecified hyperlipidemia type     Malignant neoplasm of left female breast, unspecified estrogen receptor status, unspecified site of     Malignant neoplasm of thyroid gland     Postoperative hypothyroidism     Prediabetes     Rheumatic fever     Stress incontinence (female) (male)

## 2021-08-17 NOTE — H&P PST ADULT - PROBLEM SELECTOR PLAN 2
Labs  - CBC, BMP, UA, C/S, A1c and EKG. To make appt for COVID PCR 48-72 before DOS.   MC with Dr. Song  Pre op instructions reviewed and given. Take routine am Synthroid and Anastrazole DOS with sip of water. Hold Lisino/HCTZ and Metformin Dos. Hold ASA for 7 days.  Instructed to hold and/or avoid other NSAIDs and OTC supplements. Tylenol is ok. Verbalized understanding

## 2021-08-17 NOTE — H&P PST ADULT - NSANTHOSAYNRD_GEN_A_CORE
No. JORGE L screening performed.  STOP BANG Legend: 0-2 = LOW Risk; 3-4 = INTERMEDIATE Risk; 5-8 = HIGH Risk

## 2021-08-17 NOTE — H&P PST ADULT - ATTENDING COMMENTS
Patient seen, continues to desire surg correction of GSUI and declines nonsurg intervention. History of prior TOT. Aware that sling is intended to treat MAMADOU and not OAB, and can worsen OAB symptoms. Risks of surg including but not limited to bleeding, hemorrhage, transfusion, hematoma formation, infection, risks of anesthesia such as MI / stroke / VTE / cardiopulm arrest, need for further surgery, failure, fistula formation, damage to surrounding structures such as bladder / ureters / rectum / bowel / nerves / vessels, voiding dysfunction or retention and going home with a catheter all discussed and she would like to proceed. Consent signed and witnessed. All ques answered. Proceed with EUA / TVT MUS / cysto / other indicated procedures.

## 2021-08-17 NOTE — H&P PST ADULT - GENERAL COMMENTS
Had COVID March 2020; Fully vaccinated; Denies recent international travel, recent illness and sick contact with COVID in the last 3 weeks

## 2021-08-17 NOTE — H&P PST ADULT - NSICDXPASTSURGICALHX_GEN_ALL_CORE_FT
PAST SURGICAL HISTORY:  H/O cardiac catheterization Feb 2021    History of dilatation and curettage for endometrosis      History of lumpectomy of left breast     History of thyroidectomy     History of total abdominal hysterectomy and bilateral salpingo-oophorectomy     History of umbilical hernia repair pt states hernia has retured    R Kidney Stent Placed then removed due to kidney infection as per pt

## 2021-08-18 LAB
CULTURE RESULTS: SIGNIFICANT CHANGE UP
SPECIMEN SOURCE: SIGNIFICANT CHANGE UP

## 2021-08-21 ENCOUNTER — APPOINTMENT (OUTPATIENT)
Dept: DISASTER EMERGENCY | Facility: CLINIC | Age: 46
End: 2021-08-21

## 2021-08-22 LAB — SARS-COV-2 N GENE NPH QL NAA+PROBE: NOT DETECTED

## 2021-08-23 ENCOUNTER — TRANSCRIPTION ENCOUNTER (OUTPATIENT)
Age: 46
End: 2021-08-23

## 2021-08-24 ENCOUNTER — OUTPATIENT (OUTPATIENT)
Dept: OUTPATIENT SERVICES | Facility: HOSPITAL | Age: 46
LOS: 1 days | End: 2021-08-24
Payer: COMMERCIAL

## 2021-08-24 ENCOUNTER — APPOINTMENT (OUTPATIENT)
Dept: UROGYNECOLOGY | Facility: HOSPITAL | Age: 46
End: 2021-08-24
Payer: MEDICAID

## 2021-08-24 VITALS
HEART RATE: 60 BPM | SYSTOLIC BLOOD PRESSURE: 155 MMHG | DIASTOLIC BLOOD PRESSURE: 80 MMHG | OXYGEN SATURATION: 100 % | RESPIRATION RATE: 14 BRPM

## 2021-08-24 VITALS
RESPIRATION RATE: 16 BRPM | SYSTOLIC BLOOD PRESSURE: 143 MMHG | HEART RATE: 62 BPM | OXYGEN SATURATION: 98 % | DIASTOLIC BLOOD PRESSURE: 83 MMHG | TEMPERATURE: 98 F | WEIGHT: 169.98 LBS | HEIGHT: 63 IN

## 2021-08-24 DIAGNOSIS — E89.0 POSTPROCEDURAL HYPOTHYROIDISM: Chronic | ICD-10-CM

## 2021-08-24 DIAGNOSIS — Z90.710 ACQUIRED ABSENCE OF BOTH CERVIX AND UTERUS: Chronic | ICD-10-CM

## 2021-08-24 DIAGNOSIS — Z98.890 OTHER SPECIFIED POSTPROCEDURAL STATES: Chronic | ICD-10-CM

## 2021-08-24 DIAGNOSIS — N39.3 STRESS INCONTINENCE (FEMALE) (MALE): ICD-10-CM

## 2021-08-24 PROCEDURE — 57288 REPAIR BLADDER DEFECT: CPT

## 2021-08-24 PROCEDURE — 82962 GLUCOSE BLOOD TEST: CPT

## 2021-08-24 PROCEDURE — C1771: CPT

## 2021-08-24 RX ORDER — SODIUM CHLORIDE 9 MG/ML
1000 INJECTION, SOLUTION INTRAVENOUS
Refills: 0 | Status: DISCONTINUED | OUTPATIENT
Start: 2021-08-24 | End: 2021-08-24

## 2021-08-24 RX ORDER — ANASTROZOLE 1 MG/1
1 TABLET ORAL
Qty: 0 | Refills: 0 | DISCHARGE

## 2021-08-24 RX ORDER — ONDANSETRON 8 MG/1
4 TABLET, FILM COATED ORAL ONCE
Refills: 0 | Status: DISCONTINUED | OUTPATIENT
Start: 2021-08-24 | End: 2021-08-24

## 2021-08-24 RX ORDER — LEVOTHYROXINE SODIUM 125 MCG
1 TABLET ORAL
Qty: 0 | Refills: 0 | DISCHARGE

## 2021-08-24 RX ORDER — HYDROMORPHONE HYDROCHLORIDE 2 MG/ML
0.5 INJECTION INTRAMUSCULAR; INTRAVENOUS; SUBCUTANEOUS
Refills: 0 | Status: DISCONTINUED | OUTPATIENT
Start: 2021-08-24 | End: 2021-08-24

## 2021-08-24 RX ORDER — ASPIRIN/CALCIUM CARB/MAGNESIUM 324 MG
1 TABLET ORAL
Qty: 0 | Refills: 0 | DISCHARGE

## 2021-08-24 RX ORDER — METOPROLOL TARTRATE 50 MG
1 TABLET ORAL
Qty: 0 | Refills: 0 | DISCHARGE

## 2021-08-24 RX ORDER — OXYCODONE HYDROCHLORIDE 5 MG/1
5 TABLET ORAL ONCE
Refills: 0 | Status: DISCONTINUED | OUTPATIENT
Start: 2021-08-24 | End: 2021-08-24

## 2021-08-24 RX ORDER — CEFAZOLIN SODIUM 1 G
2000 VIAL (EA) INJECTION ONCE
Refills: 0 | Status: COMPLETED | OUTPATIENT
Start: 2021-08-24 | End: 2021-08-24

## 2021-08-24 RX ORDER — LISINOPRIL/HYDROCHLOROTHIAZIDE 10-12.5 MG
1 TABLET ORAL
Qty: 0 | Refills: 0 | DISCHARGE

## 2021-08-24 RX ADMIN — SODIUM CHLORIDE 60 MILLILITER(S): 9 INJECTION, SOLUTION INTRAVENOUS at 11:19

## 2021-08-24 RX ADMIN — SODIUM CHLORIDE 75 MILLILITER(S): 9 INJECTION, SOLUTION INTRAVENOUS at 14:07

## 2021-08-24 RX ADMIN — OXYCODONE HYDROCHLORIDE 5 MILLIGRAM(S): 5 TABLET ORAL at 14:10

## 2021-08-24 RX ADMIN — OXYCODONE HYDROCHLORIDE 5 MILLIGRAM(S): 5 TABLET ORAL at 14:53

## 2021-08-24 NOTE — ASU DISCHARGE PLAN (ADULT/PEDIATRIC) - CARE PROVIDER_API CALL
Sanjuana Knapp)  Female Pelvic MedReconst Surg; Obstetrics and Gynecology  [address] [facility], [address]  [place] [address]  Phone: [phone]  Fax: [phone]  Follow Up Time:

## 2021-08-24 NOTE — ASU DISCHARGE PLAN (ADULT/PEDIATRIC) - ASU DC SPECIAL INSTRUCTIONSFT
*Call the office with any problems including but not limited to heavy vaginal bleeding, fevers, severe abdominal pain, inability to eat/ drink/ urinate. Nothing in the vagina x2 weeks- no sex, tampons, douching. *You may shower as usual but no bath tubs, hot tubs or swimming pools x2 weeks.    Call Dr. Knapp's office for an appointment for follow up in 2 weeks *Call the office with any problems including but not limited to heavy vaginal bleeding, fevers, severe abdominal pain, inability to eat/ drink/ urinate. Nothing in the vagina x 6 weeks- no sex, tampons, douching. *You may shower as usual but no bath tubs, hot tubs or swimming pools x 6 weeks.    Call Dr. Knapp's office for an appointment for follow up in 2 weeks

## 2021-08-24 NOTE — BRIEF OPERATIVE NOTE - NSICDXBRIEFPROCEDURE_GEN_ALL_CORE_FT
PROCEDURES:  Creation, midurethral sling, retropubic approach 24-Aug-2021 13:24:48  Sanjuana Knapp  Cystourethroscopy in female 24-Aug-2021 13:25:08  Sanjuana Knapp

## 2021-08-24 NOTE — BRIEF OPERATIVE NOTE - OPERATION/FINDINGS
recurrent MAMADOU (history of TOT): normal cystourethroscopy, good hemostasis, normal counts, normal digital rectal exam on completion

## 2021-09-07 ENCOUNTER — RESULT CHARGE (OUTPATIENT)
Age: 46
End: 2021-09-07

## 2021-09-07 ENCOUNTER — APPOINTMENT (OUTPATIENT)
Dept: UROGYNECOLOGY | Facility: CLINIC | Age: 46
End: 2021-09-07
Payer: MEDICAID

## 2021-09-07 VITALS
BODY MASS INDEX: 31.28 KG/M2 | DIASTOLIC BLOOD PRESSURE: 80 MMHG | TEMPERATURE: 97.8 F | HEIGHT: 62 IN | SYSTOLIC BLOOD PRESSURE: 110 MMHG | WEIGHT: 170 LBS

## 2021-09-07 PROCEDURE — 99024 POSTOP FOLLOW-UP VISIT: CPT

## 2021-09-14 LAB
APPEARANCE: CLEAR
BACTERIA UR CULT: NORMAL
BACTERIA: NEGATIVE
BILIRUB UR QL STRIP: NEGATIVE
BILIRUBIN URINE: NEGATIVE
BLOOD URINE: NEGATIVE
CLARITY UR: CLEAR
COLLECTION METHOD: NORMAL
COLOR: NORMAL
GLUCOSE QUALITATIVE U: ABNORMAL
GLUCOSE UR-MCNC: NORMAL
HCG UR QL: 0.2 EU/DL
HGB UR QL STRIP.AUTO: NORMAL
HYALINE CASTS: 0 /LPF
KETONES UR-MCNC: NEGATIVE
KETONES URINE: NEGATIVE
LEUKOCYTE ESTERASE UR QL STRIP: NORMAL
LEUKOCYTE ESTERASE URINE: NEGATIVE
MICROSCOPIC-UA: NORMAL
NITRITE UR QL STRIP: NEGATIVE
NITRITE URINE: NEGATIVE
PH UR STRIP: 5
PH URINE: 5.5
PROT UR STRIP-MCNC: NEGATIVE
PROTEIN URINE: NEGATIVE
RED BLOOD CELLS URINE: 0 /HPF
SP GR UR STRIP: 1.01
SPECIFIC GRAVITY URINE: 1.01
SQUAMOUS EPITHELIAL CELLS: 0 /HPF
UROBILINOGEN URINE: NORMAL
WHITE BLOOD CELLS URINE: 0 /HPF

## 2021-09-14 NOTE — OBJECTIVE
[Post Void Residual ____ ml] : Post Void Residual was [unfilled] ml [Soft and Nontender] : soft and nontender [Clean, Dry, Intact] : Clean, Dry, Intact [FreeTextEntry2] : dermabond intact [FreeTextEntry3] : deferred

## 2021-09-14 NOTE — DISCUSSION/SUMMARY
[Post-Op instructions given. Pt/family verbalizes understanding] : post-operative instructions were provided to the patient/family who verbalize understanding [FreeTextEntry1] : Doing well 2 weeks post-op.  Had urgency/frequency prior to surgery but will send urine for UA/CS d/t one episode of dysuria yesterday.  Post-op restrictions reinforced.  RTO in 4 weeks for f/u, or sooner if needed.  Instructed to call office with any questions or concerns and she verbalizes understanding.  \par \par Addendum: As per MA, not enough urine was provided to send for culture.  Called pt and advised her to call office if dysuria returns/persists to repeat urine and she verbalizes understanding.

## 2021-09-14 NOTE — SUBJECTIVE
[FreeTextEntry1] : Feeling good overall [FreeTextEntry7] : has some discomfort left lower pelvis and low back improving, takes tylenol prn [FreeTextEntry5] : +urge feels like leakage, but not.  No leakage with c/s/l.  urinating more often but also               drinking more fluid.  Nocturia 2-3.  Subjective complete bladder emptying.  Had one episode of burning with urination yesterday.  No fever or blood in urine.   [FreeTextEntry4] : Soft, no straining, taking a laxative as needed  [FreeTextEntry9] : Vaginal bleeding improving- pink staining presently

## 2021-09-23 ENCOUNTER — APPOINTMENT (OUTPATIENT)
Dept: ORTHOPEDIC SURGERY | Facility: CLINIC | Age: 46
End: 2021-09-23

## 2021-09-23 NOTE — HISTORY OF PRESENT ILLNESS
[de-identified] : 9/23/21: Bekah is a pleasant 46-year-old right-hand-dominant female who returns to the office today for follow-up regarding her right shoulder pain secondary biceps tendinitis.  At previous office visit conservative treatments were recommended.  She was prescribed meloxicam and referred for physical therapy. [ ].  The patient denies any fevers, chills, sweats, recent illnesses, numbness, tingling, weakness, or pain elsewhere at this time.\par \par 7/29/21:Bekah is a pleasant 46-year-old right-hand-dominant female who presents to the office today complaining of right shoulder pain.  The patient states that her symptoms began approximately 1 year ago.  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  Using this machine tends to aggravate the pain while rest alleviates the pain.  The pain is also worse with activities that involve overhead lifting.  It occasionally bothers her when she sleeps at night.  She is not taking any medication for pain.  She recently started with physical therapy in the past couple of weeks which has started to help the pain get better.  She has had no injections for the shoulder before.  The patient denies any fevers, chills, sweats, recent illnesses, numbness, tingling, weakness, or pain elsewhere at this time.\par 	\par She denies prior injury.\par She had paresthesia in the right shoulder down to her lower arm. \par She endorses night symptoms that wake her up.\par Symptoms worsen with activity.\par She is pre-diabetic, and cannot recall her most recent hemoglobin A1c level\par

## 2021-09-23 NOTE — DISCUSSION/SUMMARY
[de-identified] : Assessment: 46-year-old female with right shoulder pain secondary to long head of the biceps tendinitis\par \par Plan: I had a long discussion with the patient today regarding the nature of their diagnosis and treatment plan. We discussed the risks and benefits of no treatment as well as nonoperative and operative treatments.  I reviewed the patient's x-rays today with her in the office which are negative for any acute pathology.  On examination she has pain over the bicipital groove with a reproducible speeds test indicative of biceps tendinitis.  \par \par The patient verbalizes their understanding and agrees with the plan.  All questions were answered to their satisfaction.

## 2021-09-23 NOTE — PHYSICAL EXAM
[de-identified] : General:\par Awake, alert, no acute distress, Patient was cooperative and appropriate during the examination.\par \par The patient is of normal weight for height and age.\par \par Walks without an antalgic gait.\par \par Full, painless range of motion of the neck and back.\par \par Exam of the bilateral lower extremities is intact and symmetric with regards to dermatologic, vascular, and neurologic exam. Bilateral lower extremity sensation is grossly intact to light touch in the DP/SP/T/S/S nerve distributions. Intact DF/PF/EHL. BIlateral lower extremities warm and well-perfused with brisk capillary refill.\par \par \par Pulmonary:\par Regular, nonlabored breathing\par \par Abdomen:\par Soft, nontender, nondistended.\par \par Lymphatic:\par No evidence of axillary lymphadenopathy\par \par Right shoulder Exam:\par Physical exam of the shoulder demonstrates normal skin without signs of skin changes or abnormalities. No erythema, warmth, or joint effusion appreciated. \par \par Sensation intact light touch C5-T1\par Palpable radial pulse\par Radial/ulnar/median/axillary/musculocutaneous/AIN/PIN nerves grossly intact\par \par Range of motion:\par Forward Flexion: 175\par Abduction: 150\par External Rotation: 45\par Internal Rotation: L3\par \par Palpation:\par Not tender to palpation over the glenohumeral joint\par Not tender palpation over the rotator cuff insertion on the greater tuberosity\par Not tender to palpation over the AC joint\par Moderately tender to palpation of the biceps tendon/bicipital groove\par \par Strength testing:\par Supraspinatus: 5/5\par Infraspinatus: 5/5\par Subscapularis: 5/5\par \par Special test:\par Empty can test negative\par Bond impingement test negative\par Speeds test positive\par Wingate's test negative\par Lift-off test negative\par Bell-press test negative\par Cross-arm adduction test negative\par \par  [de-identified] : X-rays including 2 views of the right shoulder from  radiology on 5/20/2021 reviewed the patient today in the office.  There are mild acromioclavicular degenerative changes.  Mild calcific bursitis/calcific bursitis.  No significant glenohumeral arthritis.  No acute fracture.

## 2021-10-01 ENCOUNTER — APPOINTMENT (OUTPATIENT)
Dept: UROGYNECOLOGY | Facility: CLINIC | Age: 46
End: 2021-10-01
Payer: MEDICAID

## 2021-10-01 DIAGNOSIS — Z98.890 OTHER SPECIFIED POSTPROCEDURAL STATES: ICD-10-CM

## 2021-10-01 PROBLEM — N39.3 STRESS INCONTINENCE (FEMALE) (MALE): Chronic | Status: ACTIVE | Noted: 2021-08-17

## 2021-10-01 PROBLEM — U07.1 COVID-19: Chronic | Status: ACTIVE | Noted: 2021-02-26

## 2021-10-01 PROCEDURE — 99024 POSTOP FOLLOW-UP VISIT: CPT

## 2021-10-01 PROCEDURE — 99212 OFFICE O/P EST SF 10 MIN: CPT | Mod: 24

## 2021-10-01 PROCEDURE — 51798 US URINE CAPACITY MEASURE: CPT

## 2021-10-01 NOTE — SUBJECTIVE
[FreeTextEntry1] : no vag discharge or bleeding, no fever or chills [FreeTextEntry7] : none [FreeTextEntry6] : normal, no N/V [FreeTextEntry5] : no incomplete emptying, no gross hematuria, no dysuria or recurrent UTIs, no flank pain; no leakage with pressure cough sneeze positional changes, but bothersome urinary urgency and freq [FreeTextEntry4] : denies straining [FreeTextEntry3] : normal [FreeTextEntry2] : normal

## 2021-10-01 NOTE — DISCUSSION/SUMMARY
[Post-Op instructions given. Pt/family verbalizes understanding] : post-operative instructions were provided to the patient/family who verbalize understanding [Risks/Benefits discussed. Pt/family verbalizes understanding] : risks and benefits of the procedure were discussed with the patient/family who verbalize understanding [FreeTextEntry1] : 6 weeks s/p TVT MUS / cysto on 8/24/21. MATILDE prior to sling. At 2 week postop visit, UCx neg. Today, exam normal - PVR normal, no mesh exposure. She is bothered by the OAB which has failed behavioral modifications and OAB meds in the past. Management options including observation, behavioral modifications (dietary changes, monitoring fluid intake, bladder training, timed voids, use of pads/protective garments), kegels, PT, medications, PTNS, SNS, and bladder Botox were all reviewed. She would like bladder botox. 100 vs 200 units discussed. Risks such as retention requiring CIC, allergy, spread, anaphylaxis or difficulty breathing, hematuria, UTIs discussed and she would like to proceed. Abx prevention discussed. All ques answered.\par \par Plan:\par [] RTO for 100 units bladder botox\par \par

## 2021-10-01 NOTE — OBJECTIVE
[Post Void Residual ____ ml] : Post Void Residual was [unfilled] ml [Soft and Nontender] : soft and nontender [Clean, Dry, Intact] : Clean, Dry, Intact [Good Support] : Good support [Healing well] : healing well [No Masses or Tenderness] : no masses or tenderness [FreeTextEntry3] : nontender, no mass lesion or cyst, no mesh exposure, suture line intact, no sutures

## 2021-10-22 ENCOUNTER — APPOINTMENT (OUTPATIENT)
Dept: ORTHOPEDIC SURGERY | Facility: CLINIC | Age: 46
End: 2021-10-22

## 2021-11-12 ENCOUNTER — APPOINTMENT (OUTPATIENT)
Dept: OBGYN | Facility: CLINIC | Age: 46
End: 2021-11-12
Payer: MEDICAID

## 2021-11-12 VITALS
SYSTOLIC BLOOD PRESSURE: 120 MMHG | WEIGHT: 165 LBS | BODY MASS INDEX: 30.18 KG/M2 | TEMPERATURE: 97.8 F | DIASTOLIC BLOOD PRESSURE: 70 MMHG

## 2021-11-12 DIAGNOSIS — Z01.419 ENCOUNTER FOR GYNECOLOGICAL EXAMINATION (GENERAL) (ROUTINE) W/OUT ABNORMAL FINDINGS: ICD-10-CM

## 2021-11-12 PROCEDURE — 82270 OCCULT BLOOD FECES: CPT

## 2021-11-12 PROCEDURE — 99396 PREV VISIT EST AGE 40-64: CPT

## 2021-11-19 ENCOUNTER — APPOINTMENT (OUTPATIENT)
Dept: INTERNAL MEDICINE | Facility: CLINIC | Age: 46
End: 2021-11-19
Payer: MEDICAID

## 2021-11-19 ENCOUNTER — APPOINTMENT (OUTPATIENT)
Dept: UROGYNECOLOGY | Facility: CLINIC | Age: 46
End: 2021-11-19
Payer: MEDICAID

## 2021-11-19 VITALS
BODY MASS INDEX: 29.63 KG/M2 | DIASTOLIC BLOOD PRESSURE: 70 MMHG | HEART RATE: 79 BPM | TEMPERATURE: 98.5 F | SYSTOLIC BLOOD PRESSURE: 136 MMHG | WEIGHT: 161 LBS | OXYGEN SATURATION: 98 % | HEIGHT: 62 IN

## 2021-11-19 DIAGNOSIS — N39.3 STRESS INCONTINENCE (FEMALE) (MALE): ICD-10-CM

## 2021-11-19 DIAGNOSIS — Z87.448 PERSONAL HISTORY OF OTHER DISEASES OF URINARY SYSTEM: ICD-10-CM

## 2021-11-19 PROCEDURE — 52287 CYSTOSCOPY CHEMODENERVATION: CPT | Mod: 58

## 2021-11-19 PROCEDURE — 36415 COLL VENOUS BLD VENIPUNCTURE: CPT

## 2021-11-19 PROCEDURE — 99203 OFFICE O/P NEW LOW 30 MIN: CPT | Mod: 25

## 2021-11-19 RX ORDER — VENLAFAXINE 37.5 MG/1
37.5 TABLET ORAL AT BEDTIME
Refills: 0 | Status: DISCONTINUED | COMMUNITY
End: 2021-11-19

## 2021-11-19 RX ORDER — OXYCODONE AND ACETAMINOPHEN 5; 325 MG/1; MG/1
5-325 TABLET ORAL
Qty: 6 | Refills: 0 | Status: DISCONTINUED | COMMUNITY
Start: 2021-08-20 | End: 2021-11-19

## 2021-11-19 RX ORDER — VALACYCLOVIR 1 G/1
1 TABLET, FILM COATED ORAL
Qty: 10 | Refills: 3 | Status: DISCONTINUED | COMMUNITY
Start: 2021-01-29 | End: 2021-11-19

## 2021-11-19 NOTE — HEALTH RISK ASSESSMENT
[Yes] : Yes [Monthly or less (1 pt)] : Monthly or less (1 point) [1 or 2 (0 pts)] : 1 or 2 (0 points) [Never (0 pts)] : Never (0 points) [0] : 2) Feeling down, depressed, or hopeless: Not at all (0) [Patient reported mammogram was normal] : Patient reported mammogram was normal [Patient reported PAP Smear was normal] : Patient reported PAP Smear was normal [PHQ-2 Negative - No further assessment needed] : PHQ-2 Negative - No further assessment needed [I have developed a follow-up plan documented below in the note.] : I have developed a follow-up plan documented below in the note. [] : No [[id number]] : 0 [MammogramDate] : 11/2021 [MammogramComments] : hx of breast cancer [PapSmearDate] : 11/2021

## 2021-11-19 NOTE — HISTORY OF PRESENT ILLNESS
[FreeTextEntry1] : Patient comes in to establish care.\par  [de-identified] : EVERT MARTIN is a 46 year F who comes in to establish care.\par Pt used to see Dr in [place] in past for PCP. \par Pt has hx of MATILDE and is s/p sling and gets botox injections as well. She got Botox injection today and needs referral today.\par She has hx of left breast ca s/p lumpectomy and rtx dx 2020 and f/u Dr.Leah Stephenson in [facility]. Dr.Brian Silva is breast sx at [facility].\par She also has hx of thyroid ca s/p thyroidectomy 2012 on medication. She also has hx of dm-2 and htn. \par Patient denies any cp, sob,abdominal pain, nausea, vomiting, palpitations, fever, chills, constipation, diarrhea.\par \par

## 2021-11-19 NOTE — ASSESSMENT
[FreeTextEntry1] : 1.MATILDE: referral today for urology, s/p botox injection. Follow up with urology.\par \par 2.thyroid CA status post thyroidectomy with hypothyroidism: Continue on levothyroxine 88 my grams Monday through Friday and 100 mcg Saturday Sunday. Will obtain records of previous TFTs.\par \par 3.hypertension: Well controlled on lisinopril-HCTZ once daily.\par Check blood pressure daily, if greater than 150/90, call MD. Keep 2 gm low sodium diet, exercise as tolerated.\par \par 4.diabetes mellitus type 2: Recent hemoglobin A1c 6.4 in August, recheck at next visit. Unable to tolerate metformin due to low blood sugars.\par Pt advised to keep diabetic diet, decrease carbs and increase dietary protein intake.\par Exercise as tolerated 3-4 times a week.\par \par 5.left breast CA status post lumpectomy and radiation therapy: Continue follow up with breast surgery oncology at [facility]. She recently had negative mammogram and ultrasound of the breast.\par \par 6.hypercholesterolemia: Obtain records of previous cholesterol checks.\par Patient advised on low cholesterol diet-decrease in white carbs and exercise 150 minutes per week.\par

## 2021-11-23 LAB — HBA1C MFR BLD HPLC: 6.4

## 2021-12-07 ENCOUNTER — RESULT CHARGE (OUTPATIENT)
Age: 46
End: 2021-12-07

## 2021-12-07 ENCOUNTER — APPOINTMENT (OUTPATIENT)
Dept: UROGYNECOLOGY | Facility: CLINIC | Age: 46
End: 2021-12-07
Payer: MEDICAID

## 2021-12-07 VITALS — SYSTOLIC BLOOD PRESSURE: 116 MMHG | TEMPERATURE: 98.3 F | HEART RATE: 73 BPM | DIASTOLIC BLOOD PRESSURE: 72 MMHG

## 2021-12-07 DIAGNOSIS — Z92.29 PERSONAL HISTORY OF OTHER DRUG THERAPY: ICD-10-CM

## 2021-12-07 LAB
BILIRUB UR QL STRIP: NEGATIVE
CLARITY UR: CLEAR
COLLECTION METHOD: NORMAL
GLUCOSE UR-MCNC: NORMAL
HCG UR QL: 0.2 EU/DL
HGB UR QL STRIP.AUTO: NEGATIVE
KETONES UR-MCNC: NEGATIVE
LEUKOCYTE ESTERASE UR QL STRIP: NEGATIVE
NITRITE UR QL STRIP: NEGATIVE
PH UR STRIP: 5.5
PROT UR STRIP-MCNC: NEGATIVE
SP GR UR STRIP: 1.02

## 2021-12-07 PROCEDURE — 51798 US URINE CAPACITY MEASURE: CPT

## 2021-12-07 PROCEDURE — 99212 OFFICE O/P EST SF 10 MIN: CPT

## 2021-12-07 NOTE — PHYSICAL EXAM
[No Acute Distress] : in no acute distress [Well developed] : well developed [Well Nourished] : ~L well nourished [Good Hygeine] : demonstrates good hygeine [Oriented x3] : oriented to person, place, and time [Tenderness] : ~T no ~M abdominal tenderness observed [Distended] : not distended [Warm and Dry] : was warm and dry to touch [Normal Gait] : gait was normal [Post Void Residual ____ml] : post void residual was [unfilled] ml

## 2021-12-07 NOTE — DISCUSSION/SUMMARY
[FreeTextEntry1] : Bekah is happy with outcome of her cysto with botox. \par We discussed repeating botox if and when her symptoms return.\par call if any questions or concerns\par pt understands and agree.

## 2021-12-07 NOTE — HISTORY OF PRESENT ILLNESS
[FreeTextEntry1] : Bekah arrived today s/p Botox pt is happy denies leakage of urine with urge, laugh, cough or sneeze .states she remains with some frequency which is ok since she is not leaking. Nocturia 0-1 no dysuria, good urine flow feels like she empties well.

## 2021-12-26 NOTE — PHYSICAL EXAM
[Chaperone Declined] : Patient declined chaperone [Appropriately responsive] : appropriately responsive [Alert] : alert [No Acute Distress] : no acute distress [No Lymphadenopathy] : no lymphadenopathy [Regular Rate Rhythm] : regular rate rhythm [No Murmurs] : no murmurs [Clear to Auscultation B/L] : clear to auscultation bilaterally [Soft] : soft [Non-tender] : non-tender [Non-distended] : non-distended [No HSM] : No HSM [No Lesions] : no lesions [No Mass] : no mass [Oriented x3] : oriented x3 [Examination Of The Breasts] : a normal appearance [No Masses] : no breast masses were palpable [Labia Majora] : normal [Labia Minora] : normal [Absent] : absent [Uterine Adnexae] : normal [Normal rectal exam] : was normal [Normal Brown Stool] : was normal and brown [Occult Blood Positive] : was negative for occult blood analysis [Internal Hemorrhoid] : no internal hemorrhoids were present [External Hemorrhoid] : no external hemorrhoids were present [Skin Tags] : no residual hemorrhoidal skin tags [Normal] : was normal [None] : there was no rectal mass

## 2021-12-26 NOTE — HISTORY OF PRESENT ILLNESS
[FreeTextEntry1] : PT PRESENTS FOR ANNUAL VISIT. SEEING UROGYN FOR URINARY URGENCY. DENIES COMPLAINTS OTHERWISE

## 2021-12-26 NOTE — PLAN
[FreeTextEntry1] : PT PRESENTS FOR ANNUAL VISIT. DISCUSSED MENSTRUAL CHANGES. MAMMOGRAM ORDERED. SELF BREAST EXAM DISCUSSED. DISCUSSED MENOPAUSAL CHANGES AND EXPECTATIONS. DIET AND EXERCISE DISCUSSED. BREAST MAMMO ORDERED. DISCUSSED UROGYN FOLLOW UP. PT DOING BETTER AFTER THE LOSS OF HER

## 2022-01-01 NOTE — ED ADULT NURSE NOTE - CAS EDN DISCHARGE INTERVENTIONS
IV discontinued, cath removed intact CCHD done on 3/27@1510 right hand O2 is 100% and right foot O2 is 98% on room air

## 2022-01-06 ENCOUNTER — NON-APPOINTMENT (OUTPATIENT)
Age: 47
End: 2022-01-06

## 2022-01-07 ENCOUNTER — NON-APPOINTMENT (OUTPATIENT)
Age: 47
End: 2022-01-07

## 2022-01-07 ENCOUNTER — APPOINTMENT (OUTPATIENT)
Dept: INTERNAL MEDICINE | Facility: CLINIC | Age: 47
End: 2022-01-07
Payer: MEDICAID

## 2022-01-07 VITALS
SYSTOLIC BLOOD PRESSURE: 140 MMHG | DIASTOLIC BLOOD PRESSURE: 82 MMHG | TEMPERATURE: 99 F | HEART RATE: 82 BPM | OXYGEN SATURATION: 97 % | WEIGHT: 156 LBS | HEIGHT: 62 IN | BODY MASS INDEX: 28.71 KG/M2

## 2022-01-07 DIAGNOSIS — R81 GLYCOSURIA: ICD-10-CM

## 2022-01-07 LAB
BILIRUB UR QL STRIP: NEGATIVE
CLARITY UR: CLEAR
COLLECTION METHOD: NORMAL
GLUCOSE BLDC GLUCOMTR-MCNC: 222
GLUCOSE UR-MCNC: NORMAL
HBA1C MFR BLD HPLC: 12.7
HCG UR QL: 0.2 EU/DL
HGB UR QL STRIP.AUTO: NORMAL
KETONES UR-MCNC: NORMAL
LEUKOCYTE ESTERASE UR QL STRIP: NEGATIVE
NITRITE UR QL STRIP: NEGATIVE
PH UR STRIP: 5.5
PROT UR STRIP-MCNC: NEGATIVE
SP GR UR STRIP: 1.01

## 2022-01-07 PROCEDURE — 82962 GLUCOSE BLOOD TEST: CPT

## 2022-01-07 PROCEDURE — 83036 HEMOGLOBIN GLYCOSYLATED A1C: CPT | Mod: QW

## 2022-01-07 PROCEDURE — 81003 URINALYSIS AUTO W/O SCOPE: CPT | Mod: QW

## 2022-01-07 PROCEDURE — 93000 ELECTROCARDIOGRAM COMPLETE: CPT

## 2022-01-07 PROCEDURE — 99215 OFFICE O/P EST HI 40 MIN: CPT | Mod: 25

## 2022-01-07 RX ORDER — LANCETS
EACH MISCELLANEOUS
Qty: 1 | Refills: 2 | Status: ACTIVE | COMMUNITY
Start: 2022-01-07 | End: 1900-01-01

## 2022-01-07 NOTE — HISTORY OF PRESENT ILLNESS
[FreeTextEntry8] : Ms. EVERT MARTIN is a 46 year F who comes in for an acute visit.\par Patient called office yesterday and she's been very thirsty with dry mouth and when she checked her blood sugar she got 465. She notes checking her blood sugar on new years sarabjit as well and it was in the 400s. She thought at that time that her machine was wrong. She refused evaluation emergency room Yesterdayfor blood work assessment and possible insulin drip.\par She has been stressed more than recently with her work, she has been eating more rice as well as [place] sweets. \par Patient denies any cp, sob,abdominal pain, nausea, vomiting, palpitations, fever, chills, constipation, diarrhea.\par

## 2022-01-07 NOTE — ASSESSMENT
[FreeTextEntry1] : 1.diabetes mellitus type 2: Very uncontrolled, blood sugar 222 in the office, hemoglobin A1c very high at 12.7, urine dip showing glucosuria and positive ketones. Patient does not wish to go to the emergency room and does not wish to start on insulin. She understands the risks of uncontrolled diabetes. Start on metformin 1000 mg b.i.d., she restarted back on metformin yesterday. She's been off medication due to side effects for the last 6 months. Monitor blood sugar 3 times daily and followup in 2 weeks.\par Discussed fasting blood work to get.\par Refer to nutritionist.\par Pt advised to keep diabetic diet, decrease carbs and increase dietary protein intake.\par Exercise as tolerated 3-4 times a week.\par \par 2.hypertension: Mildly elevated, discussed continuing on metoprolol 25 mg, lisinopril-HCTZ.\par Check blood pressure daily, if greater than 150/90, call MD. Keep 2 gm low sodium diet, exercise as tolerated.\par

## 2022-01-11 ENCOUNTER — NON-APPOINTMENT (OUTPATIENT)
Age: 47
End: 2022-01-11

## 2022-01-19 LAB
ALBUMIN SERPL ELPH-MCNC: 5 G/DL
ALP BLD-CCNC: 126 U/L
ALT SERPL-CCNC: 53 U/L
ANION GAP SERPL CALC-SCNC: 15 MMOL/L
AST SERPL-CCNC: 44 U/L
BASOPHILS # BLD AUTO: 0.03 K/UL
BASOPHILS NFR BLD AUTO: 0.4 %
BILIRUB SERPL-MCNC: 0.2 MG/DL
BUN SERPL-MCNC: 16 MG/DL
CALCIUM SERPL-MCNC: 10.3 MG/DL
CHLORIDE SERPL-SCNC: 96 MMOL/L
CHOLEST SERPL-MCNC: 283 MG/DL
CO2 SERPL-SCNC: 25 MMOL/L
CREAT SERPL-MCNC: 0.66 MG/DL
EOSINOPHIL # BLD AUTO: 0.37 K/UL
EOSINOPHIL NFR BLD AUTO: 4.9 %
ESTIMATED AVERAGE GLUCOSE: 306 MG/DL
GLUCOSE SERPL-MCNC: 213 MG/DL
HBA1C MFR BLD HPLC: 12.3 %
HCT VFR BLD CALC: 37.5 %
HDLC SERPL-MCNC: 34 MG/DL
HGB BLD-MCNC: 12.8 G/DL
IMM GRANULOCYTES NFR BLD AUTO: 0.4 %
LDLC SERPL CALC-MCNC: NORMAL MG/DL
LYMPHOCYTES # BLD AUTO: 2.27 K/UL
LYMPHOCYTES NFR BLD AUTO: 30.1 %
MAN DIFF?: NORMAL
MCHC RBC-ENTMCNC: 29.1 PG
MCHC RBC-ENTMCNC: 34.1 GM/DL
MCV RBC AUTO: 85.2 FL
MONOCYTES # BLD AUTO: 0.56 K/UL
MONOCYTES NFR BLD AUTO: 7.4 %
NEUTROPHILS # BLD AUTO: 4.29 K/UL
NEUTROPHILS NFR BLD AUTO: 56.8 %
NONHDLC SERPL-MCNC: 249 MG/DL
PLATELET # BLD AUTO: 274 K/UL
POTASSIUM SERPL-SCNC: 4.2 MMOL/L
PROT SERPL-MCNC: 7.4 G/DL
RBC # BLD: 4.4 M/UL
RBC # FLD: 12 %
SODIUM SERPL-SCNC: 136 MMOL/L
T4 FREE SERPL-MCNC: 1.6 NG/DL
TRIGL SERPL-MCNC: 727 MG/DL
TSH SERPL-ACNC: 1.28 UIU/ML
WBC # FLD AUTO: 7.55 K/UL

## 2022-01-28 ENCOUNTER — APPOINTMENT (OUTPATIENT)
Dept: INTERNAL MEDICINE | Facility: CLINIC | Age: 47
End: 2022-01-28
Payer: MEDICAID

## 2022-01-28 ENCOUNTER — RESULT REVIEW (OUTPATIENT)
Age: 47
End: 2022-01-28

## 2022-01-28 VITALS
BODY MASS INDEX: 27.79 KG/M2 | WEIGHT: 151 LBS | HEART RATE: 65 BPM | OXYGEN SATURATION: 99 % | SYSTOLIC BLOOD PRESSURE: 134 MMHG | HEIGHT: 62 IN | DIASTOLIC BLOOD PRESSURE: 90 MMHG | TEMPERATURE: 98.4 F

## 2022-01-28 PROCEDURE — 99214 OFFICE O/P EST MOD 30 MIN: CPT

## 2022-01-28 NOTE — ASSESSMENT
[FreeTextEntry1] : 1.diabetes mellitus type 2: Continue on metformin 1000 mg twice daily, recheck hemoglobin A1c in 6 weeks, refill test strips today. Zofran as needed for nausea.\par Pt advised to keep diabetic diet, decrease carbs and increase dietary protein intake.\par Exercise as tolerated 3-4 times a week.\par \par 2.hypertriglyceridemia with hyperlipidemia: Recheck fasting lipids in 6 weeks, Patient advised on low cholesterol diet-decrease in white carbs and exercise 150 minutes per week.\par \par 3.bilateral ankle swelling: Obtain bilateral ankle x-rays to rule out pathology.

## 2022-01-28 NOTE — HISTORY OF PRESENT ILLNESS
[FreeTextEntry1] : FU [de-identified] : EVERT MARTIN is a 46 year F who comes in for a follow up visit of diabetes mellitus type 2.\par She states initially on the metformin she had diarrhea but then that resolved after about a week. She is on and off nausea that occurs. Her blood sugars are doing better with morning sugars between 114 and 190s and evening sugars between 118 and 199. She's had a few incidences where the blood sugar was about 80. Glucose in the office is 101.\par She states for the past one to 2 weeks she noticed bilateral ankle swelling with mild pain to the right ankle. No injury or trauma to this area.\par Reviewed all labs today as well.\par Patient denies any cp, sob,abdominal pain, nausea, vomiting, palpitations, fever, chills, constipation, diarrhea.\par

## 2022-02-02 ENCOUNTER — OUTPATIENT (OUTPATIENT)
Dept: OUTPATIENT SERVICES | Facility: HOSPITAL | Age: 47
LOS: 1 days | End: 2022-02-02
Payer: MEDICAID

## 2022-02-02 DIAGNOSIS — Z90.710 ACQUIRED ABSENCE OF BOTH CERVIX AND UTERUS: Chronic | ICD-10-CM

## 2022-02-02 DIAGNOSIS — Z98.890 OTHER SPECIFIED POSTPROCEDURAL STATES: Chronic | ICD-10-CM

## 2022-02-02 DIAGNOSIS — M25.473 EFFUSION, UNSPECIFIED ANKLE: ICD-10-CM

## 2022-02-02 DIAGNOSIS — E89.0 POSTPROCEDURAL HYPOTHYROIDISM: Chronic | ICD-10-CM

## 2022-02-02 PROCEDURE — 73610 X-RAY EXAM OF ANKLE: CPT | Mod: 26,50

## 2022-02-23 ENCOUNTER — APPOINTMENT (OUTPATIENT)
Dept: INTERNAL MEDICINE | Facility: CLINIC | Age: 47
End: 2022-02-23
Payer: MEDICAID

## 2022-02-23 VITALS
DIASTOLIC BLOOD PRESSURE: 74 MMHG | SYSTOLIC BLOOD PRESSURE: 110 MMHG | OXYGEN SATURATION: 99 % | BODY MASS INDEX: 27.79 KG/M2 | HEART RATE: 72 BPM | WEIGHT: 151 LBS | TEMPERATURE: 98.4 F | HEIGHT: 62 IN

## 2022-02-23 DIAGNOSIS — R94.39 ABNORMAL RESULT OF OTHER CARDIOVASCULAR FUNCTION STUDY: ICD-10-CM

## 2022-02-23 DIAGNOSIS — R94.31 ABNORMAL ELECTROCARDIOGRAM [ECG] [EKG]: ICD-10-CM

## 2022-02-23 LAB
25(OH)D3 SERPL-MCNC: 28.4 NG/ML
CHOLEST SERPL-MCNC: 189 MG/DL
ESTIMATED AVERAGE GLUCOSE: 217 MG/DL
HBA1C MFR BLD HPLC: 9.2 %
HDLC SERPL-MCNC: 52 MG/DL
LDLC SERPL CALC-MCNC: 114 MG/DL
NONHDLC SERPL-MCNC: 137 MG/DL
TRIGL SERPL-MCNC: 113 MG/DL

## 2022-02-23 PROCEDURE — 99214 OFFICE O/P EST MOD 30 MIN: CPT

## 2022-02-24 NOTE — HISTORY OF PRESENT ILLNESS
[FreeTextEntry1] : FU [de-identified] : EVERT MARTIN is a 46 year F who is being seen today in follow up to discuss blood work results.\par Patient's blood work shows great improvement in her triglycerides as well as LDL. Her hemoglobin A1c is also 3 points better. She shows blood sugar readings which are showing much better of the last 2 weeks with the addition of metformin and changes in her diet. She does not wish to increase her medications.\par Patient denies any cp, sob,abdominal pain, nausea, vomiting, palpitations, fever, chills, constipation, diarrhea.\par

## 2022-02-24 NOTE — ASSESSMENT
[FreeTextEntry1] : 1.diabetes mellitus type 2: Continue on metformin 1000 mg twice daily, hemoglobin A1c still high at 9.3 but patient does not wish to start another medication at this time. Continue with dietary modifications and metformin and recheck hemoglobin A1c in 3 months.\par Pt advised to keep diabetic diet, decrease carbs and increase dietary protein intake.\par Exercise as tolerated 3-4 times a week.\par \par 2.hyperlipidemia with hypertriglyceridemia: Lipids and triglycerides are much better controlled with dietary changes. Patient advised on low cholesterol diet-decrease in white carbs and exercise 150 minutes per week.\par

## 2022-03-04 ENCOUNTER — APPOINTMENT (OUTPATIENT)
Age: 47
End: 2022-03-04
Payer: MEDICAID

## 2022-03-04 ENCOUNTER — NON-APPOINTMENT (OUTPATIENT)
Age: 47
End: 2022-03-04

## 2022-03-04 DIAGNOSIS — M25.472 EFFUSION, RIGHT ANKLE: ICD-10-CM

## 2022-03-04 DIAGNOSIS — M25.471 EFFUSION, RIGHT ANKLE: ICD-10-CM

## 2022-03-04 DIAGNOSIS — M25.475 EFFUSION, RIGHT ANKLE: ICD-10-CM

## 2022-03-04 DIAGNOSIS — M79.89 OTHER SPECIFIED SOFT TISSUE DISORDERS: ICD-10-CM

## 2022-03-04 DIAGNOSIS — M25.474 EFFUSION, RIGHT ANKLE: ICD-10-CM

## 2022-03-04 PROCEDURE — 99214 OFFICE O/P EST MOD 30 MIN: CPT

## 2022-03-04 NOTE — PHYSICAL EXAM
[de-identified] : Left Ankle Exam\par \par General: Alert and oriented x3. In no acute distress. Pleasant in nature with a normal affect. No apparent respiratory distress.\par Erythema, Warmth, Rubor: Negative\par Swelling: Positive\par \par ROM:\par 1. Dorsiflexion: 10 degrees\par 2. Plantarflexion: 40 degrees\par 3. Inversion: 10 degrees\par 4. Eversion: 10 degrees\par \par Tenderness to Palpation:\par \par 1. Lateral Malleolus: Negative\par 2. Medial Malleolus: Negative\par 3. Proximal Fibular Pain: Negative\par 4. Heel Pain: Negative\par 5. Cuboid: Negative\par 6. Navicular: Negative\par 7. Tibiotalar Joint: Negative\par 8. Subtalar Joint: Negative\par 9. Posterior Recess: Negative\par \par Tendon Pain:\par 1. Achilles: Negative\par 2. Peroneals: Negative\par 3. Posterior Tibialis: Negative\par 4. Tibialis Anterior: Negative\par \par Ligament Pain:\par 1. ATFL: Negative\par 2. CFL: Negative\par 3. PTFL: Negative\par 4. Deltoid Ligaments: Negative\par 5. Lis Franc Ligament: Negative\par \par Stability:\par 1. Anterior Drawer: Negative\par 2. Posterior Drawer: Negative\par \par Strength: 5/5 TA/GS/EHL\par Pulses: 2+ DP/PT Pulses\par Neuro: Intact motor and sensory\par \par Additional Test:\par 1. Calcaneal Squeeze Test: Negative\par 2. Syndesmosis Squeeze Test: Negative\par \par R Ankle Exam\par \par General: Alert and oriented x3. In no acute distress. Pleasant in nature with a normal affect. No apparent respiratory distress.\par Erythema, Warmth, Rubor: Negative\par Swelling: Positive\par Lateral ankle mass that is nontender just distal to the ATFL measuring approx. 4 cm x 4 cm circular. \par \par ROM:\par 1. Dorsiflexion: 10 degrees\par 2. Plantarflexion: 40 degrees\par 3. Inversion: 30 degrees\par 4. Eversion: 20 degrees\par \par Tenderness to Palpation:\par 1. Lateral Malleolus: Negative\par 2. Medial Malleolus: Negative\par 3. Proximal Fibular Pain: Negative\par 4. Heel Pain: Negative\par 5. Cuboid: Negative\par 6. Navicular: Negative\par 7. Tibiotalar Joint: Negative\par 8. Subtalar Joint: Negative\par 9. Posterior Recess: Negative\par \par Tendon Pain:\par 1. Achilles: Negative\par 2. Peroneals: Negative\par 3. Posterior Tibialis: Negative\par 4. Tibialis Anterior: Negative\par \par Ligament Pain:\par 1. ATFL: Positive\par 2. CFL: Positive\par 3. PTFL: Negative\par 4. Deltoid Ligaments: Negative\par 5. Lis Franc Ligament: Negative\par \par Stability:\par 1. Anterior Drawer: Negative\par 2. Posterior Drawer: Negative\par \par Strength: 5/5 TA/GS/EHL\par Pulses: 2+ DP/PT Pulses\par Neuro: Intact motor and sensory\par \par Additional Test:\par 1. Calcaneal Squeeze Test: Negative\par 2. Syndesmosis Squeeze Test: Negative [de-identified] : EXAM: XR ANKLE COMP MIN 3 VIEWS BI\par \par PROCEDURE DATE: 02/02/2022\par \par \par INTERPRETATION: CLINICAL INDICATION: bilateral ankle pain and swelling\par \par EXAM:\par Frontal, oblique, and lateral views of both ankles from 2/2/2022 at 0927. No similar prior studies available for comparison.\par \par IMPRESSION:\par No specific bilateral soft tissue swelling. No tracking soft tissue gas collections, radiopaque foreign bodies, or gross radiographic evidence for osteomyelitis.\par \par No fractures or dislocations.\par \par Congruent ankle mortises with smooth and intact talar domes.\par \par Preserved remaining visualized joint spaces and no joint margin erosions.\par \par Small left posterior calcaneal enthesophyte.\par \par No lytic or blastic lesions.\par \par --- End of Report ---

## 2022-03-04 NOTE — ADDENDUM
[FreeTextEntry1] : I, Soo Reyes, acted solely as a scribe for Juan Miguel Diaz PA-C on this date 03/04/2022.\par \par All medical record entries made by the Scribe were at my, Juan Miguel Diaz PA-C, direction and personally dictated by me on 03/04/2022  . I have reviewed the chart and agree that the record accurately reflects my personal performance of the history, physical exam, assessment and plan. I have also personally directed, reviewed, and agreed with the chart.	\par

## 2022-03-04 NOTE — HISTORY OF PRESENT ILLNESS
[FreeTextEntry1] : 47 y/o female presenting for evaluation of bilateral ankle swelling for the past 2 months. She was seen by her PCP who recommended XR and evaluation by orthopedics. She reports some pain to the lateral aspect of the right ankle. No pain to the left ankle, only swelling. She has tried occasional elevation with improvement in symptoms. She reports swelling is worse at the end of the day. 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 Denies any numbness or paresthesias in the feet. No other injuries. She presents with XR. No calf pain. No fevers or chills. Recent diagnosis of diabetes on metformin 2 months ago. For pain she utilizes NSAIDs PRN with minimal relief noted. No other concerns today.

## 2022-03-07 NOTE — ASU PATIENT PROFILE, ADULT - BLOOD AVOIDANCE/RESTRICTIONS, PROFILE
[Congested] : congested [Deviated to the Lt] : deviated to the left [Image(s) Captured] : image(s) captured and filed [Topical Lidocaine] : topical lidocaine [Oxymetazoline HCl] : oxymetazoline HCl [Flexible Endoscope] : examined with the flexible endoscope [Serial Number: ___] : Serial Number: [unfilled] [FreeTextEntry6] : Turbinate Hypertrophy,  Deviated Nasal Septum.  Epistaxis.  Purulent post nasal discharge > on the right side \par Severe rhinitis ? MRSA [de-identified] :  Turbinate Hypertrophy,  Deviated Nasal Septum.  Epistaxis.  Purulent post nasal discharge > on the right side  none

## 2022-03-17 ENCOUNTER — RESULT CHARGE (OUTPATIENT)
Age: 47
End: 2022-03-17

## 2022-03-18 ENCOUNTER — APPOINTMENT (OUTPATIENT)
Dept: CARDIOLOGY | Facility: CLINIC | Age: 47
End: 2022-03-18
Payer: MEDICAID

## 2022-03-18 VITALS
DIASTOLIC BLOOD PRESSURE: 72 MMHG | SYSTOLIC BLOOD PRESSURE: 120 MMHG | WEIGHT: 150 LBS | HEIGHT: 63 IN | RESPIRATION RATE: 16 BRPM | BODY MASS INDEX: 26.58 KG/M2 | HEART RATE: 56 BPM

## 2022-03-18 PROCEDURE — 99214 OFFICE O/P EST MOD 30 MIN: CPT

## 2022-03-18 PROCEDURE — 93000 ELECTROCARDIOGRAM COMPLETE: CPT

## 2022-03-21 ENCOUNTER — OUTPATIENT (OUTPATIENT)
Dept: OUTPATIENT SERVICES | Facility: HOSPITAL | Age: 47
LOS: 1 days | End: 2022-03-21

## 2022-03-21 ENCOUNTER — APPOINTMENT (OUTPATIENT)
Dept: MRI IMAGING | Facility: CLINIC | Age: 47
End: 2022-03-21
Payer: MEDICAID

## 2022-03-21 DIAGNOSIS — Z90.710 ACQUIRED ABSENCE OF BOTH CERVIX AND UTERUS: Chronic | ICD-10-CM

## 2022-03-21 DIAGNOSIS — Z98.890 OTHER SPECIFIED POSTPROCEDURAL STATES: Chronic | ICD-10-CM

## 2022-03-21 DIAGNOSIS — Z00.8 ENCOUNTER FOR OTHER GENERAL EXAMINATION: ICD-10-CM

## 2022-03-21 DIAGNOSIS — E89.0 POSTPROCEDURAL HYPOTHYROIDISM: Chronic | ICD-10-CM

## 2022-03-21 PROCEDURE — 73721 MRI JNT OF LWR EXTRE W/O DYE: CPT | Mod: 26,RT

## 2022-03-21 NOTE — ASSESSMENT
[FreeTextEntry1] : 1.  EKG today reveals sinus bradycardia at 56 bpm.  Normal intervals.  RSr' in lead V1.  No ischemia. \par \par 2.  Hypertension:  Blood pressure well controlled at this time on current medications.  A low-salt diet is advised. \par \par 3.  Hyperlipidemia:  Recent lipid profile demonstrates a total cholesterol of 189, HDL 52, , triglycerides 113.  Patient currently not on statin therapy.  I have advised her on a stricter low-fat / low-cholesterol diet which includes the reduction of daily eggs.  The patient also advised to be in gradual exercise.  Should under follow up lipid profile in approximately three months for reassessment.  \par \par 4.  Diabetes mellitus:  Recent hemoglobin A1C 9.2.  Patient is advised on a stricter low-carbohydrate diet as well as follow up with her PCP.  May benefit from endocrine consultation at some point.  If clinically stable from a cardiac standpoint, follow up office visit 12 months.     \par

## 2022-03-21 NOTE — REASON FOR VISIT
[FreeTextEntry1] : Mrs. Christiansen is a delightful 46-year-old  female, native of [facility], with a past medical history significant for hyperlipidemia, non-obstructive coronary artery disease noted on cardiac catheterization last year, thyroid cancer status-post total thyroidectomy, and hypertension, who presents for follow up evaluation. \par \par

## 2022-03-21 NOTE — HISTORY OF PRESENT ILLNESS
[FreeTextEntry1] : From a cardiac standpoint, Mrs. Christiansen denies exertional chest pain, shortness of breath, palpitations, lightheadedness, or syncope.   She is not exercising on a regular basis.  \par \par

## 2022-03-31 ENCOUNTER — TRANSCRIPTION ENCOUNTER (OUTPATIENT)
Age: 47
End: 2022-03-31

## 2022-04-01 ENCOUNTER — APPOINTMENT (OUTPATIENT)
Dept: ORTHOPEDIC SURGERY | Facility: CLINIC | Age: 47
End: 2022-04-01
Payer: MEDICAID

## 2022-04-01 ENCOUNTER — NON-APPOINTMENT (OUTPATIENT)
Age: 47
End: 2022-04-01

## 2022-04-01 DIAGNOSIS — M25.571 PAIN IN RIGHT ANKLE AND JOINTS OF RIGHT FOOT: ICD-10-CM

## 2022-04-01 PROCEDURE — 99441: CPT

## 2022-04-07 ENCOUNTER — APPOINTMENT (OUTPATIENT)
Dept: INTERNAL MEDICINE | Facility: CLINIC | Age: 47
End: 2022-04-07
Payer: MEDICAID

## 2022-04-07 VITALS
BODY MASS INDEX: 26.4 KG/M2 | DIASTOLIC BLOOD PRESSURE: 84 MMHG | HEIGHT: 63 IN | SYSTOLIC BLOOD PRESSURE: 124 MMHG | RESPIRATION RATE: 14 BRPM | WEIGHT: 149 LBS | OXYGEN SATURATION: 98 % | HEART RATE: 68 BPM

## 2022-04-07 DIAGNOSIS — J30.2 OTHER SEASONAL ALLERGIC RHINITIS: ICD-10-CM

## 2022-04-07 DIAGNOSIS — M25.473 EFFUSION, UNSPECIFIED ANKLE: ICD-10-CM

## 2022-04-07 LAB
ALBUMIN SERPL ELPH-MCNC: 4.6 G/DL
ALP BLD-CCNC: 84 U/L
ALT SERPL-CCNC: 16 U/L
ANION GAP SERPL CALC-SCNC: 13 MMOL/L
AST SERPL-CCNC: 21 U/L
BILIRUB SERPL-MCNC: 0.4 MG/DL
BUN SERPL-MCNC: 25 MG/DL
CALCIUM SERPL-MCNC: 9.7 MG/DL
CHLORIDE SERPL-SCNC: 104 MMOL/L
CO2 SERPL-SCNC: 24 MMOL/L
CREAT SERPL-MCNC: 0.79 MG/DL
EGFR: 92 ML/MIN/1.73M2
ESTIMATED AVERAGE GLUCOSE: 154 MG/DL
GLUCOSE SERPL-MCNC: 103 MG/DL
HBA1C MFR BLD HPLC: 7 %
POTASSIUM SERPL-SCNC: 4.4 MMOL/L
PROT SERPL-MCNC: 6.8 G/DL
SODIUM SERPL-SCNC: 141 MMOL/L

## 2022-04-07 PROCEDURE — 99214 OFFICE O/P EST MOD 30 MIN: CPT

## 2022-04-07 RX ORDER — LORATADINE 10 MG/1
10 TABLET ORAL
Qty: 30 | Refills: 3 | Status: ACTIVE | COMMUNITY
Start: 2022-04-07 | End: 1900-01-01

## 2022-04-07 NOTE — ASSESSMENT
[FreeTextEntry1] : 1.diabetes mellitus type 2: Hemoglobin A1c much improved to 7.0.  Patient commended for her efforts.  Patient with recent symptoms of hypoglycemia between 58 and 82.  Discussed decreasing Metformin to 5 mg in the morning and 1000 mg in the evening.  Continue diabetic diet and follow-up in 3 months.\par \par 2.right ankle pain: Recent MRI done by orthopedics, patient will touch base with orthopedics again to see if she should proceed with physical therapy.

## 2022-04-07 NOTE — HISTORY OF PRESENT ILLNESS
[FreeTextEntry1] : FU [de-identified] : EVERT MARTIN is a 46 year F who comes in for a follow up visit of diabetes mellitus type 2.\par Pt notes she had BG has dropped to 58-67 on 3 occasions midday after eating breakfast but before lunch and had sx of weakness and cold. She had sugary coffee and chocolate. \par She did see cardio and no med changes and also spoke to ortho about right ankle pain and mri ankle done and should get PT. \par Patient denies any cp, sob,abdominal pain, nausea, vomiting, palpitations, fever, chills, constipation, diarrhea.\par

## 2022-04-15 ENCOUNTER — APPOINTMENT (OUTPATIENT)
Dept: UROGYNECOLOGY | Facility: CLINIC | Age: 47
End: 2022-04-15
Payer: MEDICAID

## 2022-04-15 ENCOUNTER — RESULT CHARGE (OUTPATIENT)
Age: 47
End: 2022-04-15

## 2022-04-15 LAB
BILIRUB UR QL STRIP: NEGATIVE
CLARITY UR: CLEAR
COLLECTION METHOD: NORMAL
GLUCOSE UR-MCNC: NEGATIVE
HCG UR QL: 0.2 EU/DL
HGB UR QL STRIP.AUTO: NEGATIVE
KETONES UR-MCNC: NEGATIVE
LEUKOCYTE ESTERASE UR QL STRIP: NEGATIVE
NITRITE UR QL STRIP: NEGATIVE
PH UR STRIP: 5.5
PROT UR STRIP-MCNC: NEGATIVE
SP GR UR STRIP: 1.02

## 2022-04-15 PROCEDURE — 52287 CYSTOSCOPY CHEMODENERVATION: CPT

## 2022-05-09 ENCOUNTER — APPOINTMENT (OUTPATIENT)
Dept: UROGYNECOLOGY | Facility: CLINIC | Age: 47
End: 2022-05-09
Payer: MEDICAID

## 2022-05-09 VITALS
BODY MASS INDEX: 26.4 KG/M2 | HEIGHT: 63 IN | DIASTOLIC BLOOD PRESSURE: 83 MMHG | HEART RATE: 79 BPM | SYSTOLIC BLOOD PRESSURE: 124 MMHG | WEIGHT: 149 LBS

## 2022-05-09 PROCEDURE — 99212 OFFICE O/P EST SF 10 MIN: CPT

## 2022-05-09 PROCEDURE — 51798 US URINE CAPACITY MEASURE: CPT

## 2022-05-09 NOTE — HISTORY OF PRESENT ILLNESS
[FreeTextEntry1] : Pt is s/p 100 units of intradetrusor botox on 4/15/2022.  This is her second round of botox injections.  Today she reports 100% improvement in her symptoms.  She denies any dysuria or UTI symptoms.  PVR normal today.  She denies any subjective feelings of incomplete emptying and she is very happy with treatment.  First injection lasted about 4 months.  She will schedule appointment for repeat injections in 4 months from now.  She will also start pelvic floor PT.  She was advised to call office if she has symptoms of UTI or if she feels she is not emptying her bladder well.  F/u 4 months  or sooner if needed.  Instructed to call with any questions or concerns and she verbalizes understanding.

## 2022-06-03 PROBLEM — M25.571 RIGHT ANKLE PAIN: Status: ACTIVE | Noted: 2022-03-04

## 2022-06-14 DIAGNOSIS — Z91.018 ALLERGY TO OTHER FOODS: ICD-10-CM

## 2022-06-14 DIAGNOSIS — Z91.010 ALLERGY TO PEANUTS: ICD-10-CM

## 2022-07-07 ENCOUNTER — APPOINTMENT (OUTPATIENT)
Dept: INTERNAL MEDICINE | Facility: CLINIC | Age: 47
End: 2022-07-07

## 2022-07-07 VITALS
DIASTOLIC BLOOD PRESSURE: 74 MMHG | SYSTOLIC BLOOD PRESSURE: 108 MMHG | TEMPERATURE: 98.4 F | HEIGHT: 63 IN | BODY MASS INDEX: 27.82 KG/M2 | OXYGEN SATURATION: 99 % | WEIGHT: 157 LBS | HEART RATE: 66 BPM

## 2022-07-07 LAB
ANION GAP SERPL CALC-SCNC: 14 MMOL/L
BUN SERPL-MCNC: 26 MG/DL
CALCIUM SERPL-MCNC: 9.7 MG/DL
CHLORIDE SERPL-SCNC: 101 MMOL/L
CHOLEST SERPL-MCNC: 203 MG/DL
CO2 SERPL-SCNC: 25 MMOL/L
CREAT SERPL-MCNC: 0.73 MG/DL
EGFR: 101 ML/MIN/1.73M2
ESTIMATED AVERAGE GLUCOSE: 126 MG/DL
GLUCOSE SERPL-MCNC: 82 MG/DL
HBA1C MFR BLD HPLC: 6 %
HDLC SERPL-MCNC: 64 MG/DL
LDLC SERPL CALC-MCNC: 125 MG/DL
NONHDLC SERPL-MCNC: 139 MG/DL
POTASSIUM SERPL-SCNC: 4.4 MMOL/L
SODIUM SERPL-SCNC: 140 MMOL/L
TRIGL SERPL-MCNC: 73 MG/DL

## 2022-07-07 PROCEDURE — 99214 OFFICE O/P EST MOD 30 MIN: CPT

## 2022-07-07 RX ORDER — NEOMYCIN AND POLYMYXIN B SULFATES AND HYDROCORTISONE OTIC 10; 3.5; 1 MG/ML; MG/ML; [USP'U]/ML
3.5-10000-1 SUSPENSION AURICULAR (OTIC)
Qty: 10 | Refills: 0 | Status: DISCONTINUED | COMMUNITY
Start: 2022-01-21

## 2022-07-07 RX ORDER — AZITHROMYCIN 250 MG/1
250 TABLET, FILM COATED ORAL
Qty: 6 | Refills: 0 | Status: DISCONTINUED | COMMUNITY
Start: 2022-01-21

## 2022-07-07 RX ORDER — NITROFURANTOIN (MONOHYDRATE/MACROCRYSTALS) 25; 75 MG/1; MG/1
100 CAPSULE ORAL
Qty: 10 | Refills: 0 | Status: DISCONTINUED | COMMUNITY
Start: 2022-04-11 | End: 2022-07-07

## 2022-07-07 RX ORDER — ALBUTEROL SULFATE 90 UG/1
108 AEROSOL, METERED RESPIRATORY (INHALATION)
Refills: 0 | Status: DISCONTINUED | COMMUNITY
End: 2022-07-07

## 2022-07-07 RX ORDER — BENZONATATE 200 MG/1
200 CAPSULE ORAL
Qty: 20 | Refills: 0 | Status: DISCONTINUED | COMMUNITY
Start: 2022-01-21

## 2022-07-07 NOTE — ASSESSMENT
[FreeTextEntry1] : 1.diabetes mellitus type 2: Hemoglobin A1c much improved to 6.0.  Patient commended for her efforts.  Patient with recent symptoms of hypoglycemia between 58 and 82.  Continue on metformin 1000 mg twice a day Saturday Sunday and once a day from Monday through Friday.  Continue diabetic diet and follow-up in 3 months.\par \par 2.hyperlipidemia: Lipid panel doing well, Patient advised on low cholesterol diet-decrease in white carbs and exercise 150 minutes per week.\par \par 3.hypertension: Doing well on lisinopril HCTZ, refill today.\par \par 4.history of malignant thyroid neoplasm with hypothyroidism: Continue levothyroxine 88 mcg Monday to Friday and 100 mcg Saturday Sunday.  Obtain records to see if prior thyroid ultrasound.\par \par 5.left breast see status post lumpectomy and radiation therapy: Continue follow-up with breast oncology at [facility], patient states she had negative mammogram and ultrasound in the fall.\par \par 6.health maintenance: Advised having colonoscopy, patient defers at this time.  Follow-up in 2 months for CPE.

## 2022-07-07 NOTE — HISTORY OF PRESENT ILLNESS
[FreeTextEntry1] : FU [de-identified] : EVERT MARTIN is a 47 year F who comes in for a follow up visit.\par Pt notes 1 week she had sore throat, sinus pressure and had exposure at a party with covid on 6/25 and came to know 6/27 and went to [facility] on 6/29 and had negative rapid covid test and was tx with abx for sinus infection.\par We did go over her bw results today and hba1c is doing very well at 6.0, she takes metformin 1 gm bid on weekends and once daily M-Fri. \par She has not had a physical yet this year and she is due for that at this time as well as multiple refills.  She has never had a colonoscopy before and states she had mammogram and Pap smear done last fall which were normal.\par Patient denies any cp, sob,abdominal pain, nausea, vomiting, palpitations, fever, chills, constipation, diarrhea.\par

## 2022-07-11 ENCOUNTER — RESULT CHARGE (OUTPATIENT)
Age: 47
End: 2022-07-11

## 2022-07-11 ENCOUNTER — APPOINTMENT (OUTPATIENT)
Dept: UROGYNECOLOGY | Facility: CLINIC | Age: 47
End: 2022-07-11

## 2022-07-11 DIAGNOSIS — R10.2 PELVIC AND PERINEAL PAIN: ICD-10-CM

## 2022-07-11 DIAGNOSIS — R30.0 DYSURIA: ICD-10-CM

## 2022-07-11 LAB
BILIRUB UR QL STRIP: NEGATIVE
CLARITY UR: CLEAR
COLLECTION METHOD: NORMAL
GLUCOSE UR-MCNC: NEGATIVE
HCG UR QL: 0.2 EU/DL
HGB UR QL STRIP.AUTO: NORMAL
KETONES UR-MCNC: NEGATIVE
LEUKOCYTE ESTERASE UR QL STRIP: NEGATIVE
NITRITE UR QL STRIP: NEGATIVE
PH UR STRIP: 6
PROT UR STRIP-MCNC: NEGATIVE
SP GR UR STRIP: 1.02

## 2022-07-11 PROCEDURE — 99213 OFFICE O/P EST LOW 20 MIN: CPT | Mod: 25

## 2022-07-11 PROCEDURE — 81003 URINALYSIS AUTO W/O SCOPE: CPT | Mod: QW

## 2022-07-11 PROCEDURE — 51701 INSERT BLADDER CATHETER: CPT

## 2022-07-11 NOTE — PROCEDURE
[Straight Catheterization] : insertion of a straight catheter [Urinary Tract Infection] : a urinary tract infection [Patient] : the patient [___ Fr Straight Tip] : a [unfilled] in [place] of [place] straight tip catheter [None] : there were no complications with the catheter insertion [Clear] : clear [Culture] : culture [Urinalysis] : urinalysis [No Complications] : no complications [Tolerated Well] : the patient tolerated the procedure well [Post procedure instructions and information given] : Post procedure instructions and information were given and reviewed with patient. [0] : 0 [FreeTextEntry9] : PVR 30ml

## 2022-07-11 NOTE — HISTORY OF PRESENT ILLNESS
[FreeTextEntry1] : Pt was in office with PT and c/o intermittent bladder pain and intermittent dysuria x a few days and was added on to r/o UTI.  She is s/p 100 units intradetrusor botox on 4/15/22.  PVR 24ml at 5/9 visit and she was overall very happy with botox.  She denies any subjective feelings of incomplete emptying, denies any increased urgency, frequency, blood in the urine, back pain, fever or chills.  She is scheduled for repeat injection in August.

## 2022-07-11 NOTE — PHYSICAL EXAM
[No Acute Distress] : in no acute distress [Well developed] : well developed [Well Nourished] : ~L well nourished [Good Hygeine] : demonstrates good hygeine [Oriented x3] : oriented to person, place, and time [Normal Memory] : ~T memory was ~L unimpaired [Normal Mood/Affect] : mood and affect are normal [Soft, Nontender] : the abdomen was soft and nontender [None] : no CVA tenderness [Warm and Dry] : was warm and dry to touch [Normal Gait] : gait was normal [Normal] : normal external genitalia

## 2022-07-12 LAB
APPEARANCE: CLEAR
BACTERIA: NEGATIVE
BILIRUBIN URINE: NEGATIVE
BLOOD URINE: NEGATIVE
COLOR: NORMAL
GLUCOSE QUALITATIVE U: NEGATIVE
HYALINE CASTS: 0 /LPF
KETONES URINE: NEGATIVE
LEUKOCYTE ESTERASE URINE: NEGATIVE
MICROSCOPIC-UA: NORMAL
NITRITE URINE: NEGATIVE
PH URINE: 6
PROTEIN URINE: NEGATIVE
RED BLOOD CELLS URINE: 1 /HPF
SPECIFIC GRAVITY URINE: 1.03
SQUAMOUS EPITHELIAL CELLS: 2 /HPF
UROBILINOGEN URINE: NORMAL
WHITE BLOOD CELLS URINE: 0 /HPF

## 2022-07-13 LAB — BACTERIA UR CULT: NORMAL

## 2022-08-12 ENCOUNTER — RX RENEWAL (OUTPATIENT)
Age: 47
End: 2022-08-12

## 2022-09-07 ENCOUNTER — TRANSCRIPTION ENCOUNTER (OUTPATIENT)
Age: 47
End: 2022-09-07

## 2022-09-09 ENCOUNTER — APPOINTMENT (OUTPATIENT)
Dept: INTERNAL MEDICINE | Facility: CLINIC | Age: 47
End: 2022-09-09

## 2022-09-09 VITALS
WEIGHT: 154 LBS | BODY MASS INDEX: 27.29 KG/M2 | HEIGHT: 63 IN | SYSTOLIC BLOOD PRESSURE: 120 MMHG | HEART RATE: 60 BPM | DIASTOLIC BLOOD PRESSURE: 64 MMHG | TEMPERATURE: 98.6 F | OXYGEN SATURATION: 98 %

## 2022-09-09 DIAGNOSIS — D05.10 INTRADUCTAL CARCINOMA IN SITU OF UNSPECIFIED BREAST: ICD-10-CM

## 2022-09-09 DIAGNOSIS — E55.9 VITAMIN D DEFICIENCY, UNSPECIFIED: ICD-10-CM

## 2022-09-09 DIAGNOSIS — Z00.00 ENCOUNTER FOR GENERAL ADULT MEDICAL EXAMINATION W/OUT ABNORMAL FINDINGS: ICD-10-CM

## 2022-09-09 PROCEDURE — 99396 PREV VISIT EST AGE 40-64: CPT

## 2022-09-09 RX ORDER — ANASTROZOLE TABLETS 1 MG/1
1 TABLET ORAL
Refills: 0 | Status: ACTIVE | COMMUNITY

## 2022-09-09 RX ORDER — ONDANSETRON 4 MG/1
4 TABLET, ORALLY DISINTEGRATING ORAL
Qty: 20 | Refills: 1 | Status: DISCONTINUED | COMMUNITY
Start: 2022-01-28 | End: 2022-09-09

## 2022-09-09 RX ORDER — LEVOCETIRIZINE DIHYDROCHLORIDE 5 MG/1
5 TABLET ORAL
Qty: 30 | Refills: 0 | Status: DISCONTINUED | COMMUNITY
Start: 2022-04-08 | End: 2022-09-09

## 2022-09-09 RX ORDER — AMOXICILLIN 875 MG/1
875 TABLET, FILM COATED ORAL
Qty: 20 | Refills: 0 | Status: DISCONTINUED | COMMUNITY
Start: 2022-06-29 | End: 2022-09-09

## 2022-09-09 NOTE — HEALTH RISK ASSESSMENT
[Never] : Never [Yes] : Yes [2 - 4 times a month (2 pts)] : 2-4 times a month (2 points) [1 or 2 (0 pts)] : 1 or 2 (0 points) [Less than monthly (1 pt)] : Less than monthly (1 point) [No] : In the past 12 months have you used drugs other than those required for medical reasons? No [0] : 2) Feeling down, depressed, or hopeless: Not at all (0) [Patient reported mammogram was normal] : Patient reported mammogram was normal [PHQ-2 Negative - No further assessment needed] : PHQ-2 Negative - No further assessment needed [I have developed a follow-up plan documented below in the note.] : I have developed a follow-up plan documented below in the note. [Patient reported PAP Smear was normal] : Patient reported PAP Smear was normal [Patient reported bone density results were normal] : Patient reported bone density results were normal [[id number]] : 0 [EyeExamDate] : 04/2022 [MammogramDate] : 11/1/21 [MammogramComments] : Gabrielle Black [PapSmearDate] : 11/12/21 [PapSmearComments] : hysterectomy [BoneDensityDate] : 2022

## 2022-09-09 NOTE — HISTORY OF PRESENT ILLNESS
[FreeTextEntry1] : CPE [de-identified] : EVERT MARTIN is a 47 year F who comes in for an annual physical exam.\par Pt with hx of MATILDE s/p botox injection, thyroid CA s/p thyroidectomy, dm-2, htn, Left breast CA s/p lumpectomy and rtx and HLD comes in CPE. \par She saw ortho  last yr and dx with bicep tendonitis of right shoulder due to pain and had PT but no longer covered by insurance. 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 \par Patient denies any cp, sob,abdominal pain, nausea, vomiting, palpitations, fever, chills, constipation, diarrhea.\par

## 2022-09-09 NOTE — ASSESSMENT
[FreeTextEntry1] : 1.health maintenance: Up-to-date with mammogram, Pap smear, bone density.  Given referral to GI for colonoscopy as she has never had one prior. Discussed fasting blood work to get.\par Patient counseled regarding recommendations for vaccines, seat belt safety, diet and exercise and all preventative screening.\par \par 2.MATILDE: Status post Botox injections, continue follow-up with urogynecology.\par \par 3.thyroid CA status post thyroidectomy with hypothyroidism: Continue on levothyroxine 88 mcg Monday through Friday and 100 mcg Saturday Sunday, check TFTs.\par \par 4.hypertension: Well-controlled on lisinopril HCTZ and metoprolol, Check blood pressure daily, if greater than 150/90, call MD. Keep 2 gm low sodium diet, exercise as tolerated.\par \par 5.diabetes mellitus type 2: Patient states her blood sugars are going lower post breakfast 115 but post lunch going down to 80 and 90, she has been taking metformin 500 mg once daily Monday through Friday and twice daily on Saturday Sunday.  Check hemoglobin A1c and advised patient on metformin dosing.\par \par 6.left breast see status post lobectomy and radiation therapy: Follow-up with breast surgery and oncology at [facility], obtain records of recent mammogram, continue on anastrozole.\par \par 7.hyperlipidemia: Recheck nonfasting lipids and Patient advised on low cholesterol diet-decrease in white carbs and exercise 150 minutes per week.\par

## 2022-09-16 LAB
25(OH)D3 SERPL-MCNC: 33.5 NG/ML
ALBUMIN SERPL ELPH-MCNC: 4.9 G/DL
ALP BLD-CCNC: 81 U/L
ALT SERPL-CCNC: 14 U/L
ANION GAP SERPL CALC-SCNC: 14 MMOL/L
AST SERPL-CCNC: 26 U/L
BASOPHILS # BLD AUTO: 0.01 K/UL
BASOPHILS NFR BLD AUTO: 0.2 %
BILIRUB SERPL-MCNC: 0.2 MG/DL
BUN SERPL-MCNC: 23 MG/DL
CALCIUM SERPL-MCNC: 9.4 MG/DL
CHLORIDE SERPL-SCNC: 104 MMOL/L
CHOLEST SERPL-MCNC: 193 MG/DL
CO2 SERPL-SCNC: 25 MMOL/L
CREAT SERPL-MCNC: 0.95 MG/DL
EGFR: 73 ML/MIN/1.73M2
EOSINOPHIL # BLD AUTO: 0.1 K/UL
EOSINOPHIL NFR BLD AUTO: 1.9 %
ESTIMATED AVERAGE GLUCOSE: 123 MG/DL
GLUCOSE SERPL-MCNC: 108 MG/DL
HBA1C MFR BLD HPLC: 5.9 %
HCT VFR BLD CALC: 35.3 %
HDLC SERPL-MCNC: 51 MG/DL
HGB BLD-MCNC: 11.3 G/DL
IMM GRANULOCYTES NFR BLD AUTO: 0.2 %
LDLC SERPL CALC-MCNC: 75 MG/DL
LYMPHOCYTES # BLD AUTO: 1.87 K/UL
LYMPHOCYTES NFR BLD AUTO: 35 %
MAN DIFF?: NORMAL
MCHC RBC-ENTMCNC: 28.2 PG
MCHC RBC-ENTMCNC: 32 GM/DL
MCV RBC AUTO: 88 FL
MONOCYTES # BLD AUTO: 0.36 K/UL
MONOCYTES NFR BLD AUTO: 6.7 %
NEUTROPHILS # BLD AUTO: 2.99 K/UL
NEUTROPHILS NFR BLD AUTO: 56 %
NONHDLC SERPL-MCNC: 142 MG/DL
PLATELET # BLD AUTO: 259 K/UL
POTASSIUM SERPL-SCNC: 4.2 MMOL/L
PROT SERPL-MCNC: 6.9 G/DL
RBC # BLD: 4.01 M/UL
RBC # FLD: 12.2 %
SODIUM SERPL-SCNC: 143 MMOL/L
T4 FREE SERPL-MCNC: 1.8 NG/DL
TRIGL SERPL-MCNC: 335 MG/DL
TSH SERPL-ACNC: 0.03 UIU/ML
VIT B12 SERPL-MCNC: 796 PG/ML
WBC # FLD AUTO: 5.34 K/UL

## 2022-09-19 RX ORDER — LEVOTHYROXINE SODIUM 0.1 MG/1
100 TABLET ORAL
Qty: 90 | Refills: 0 | Status: DISCONTINUED | COMMUNITY
Start: 2022-08-12 | End: 2022-09-19

## 2022-10-07 ENCOUNTER — TRANSCRIPTION ENCOUNTER (OUTPATIENT)
Age: 47
End: 2022-10-07

## 2022-10-19 ENCOUNTER — RX RENEWAL (OUTPATIENT)
Age: 47
End: 2022-10-19

## 2022-11-22 LAB
CHOLEST SERPL-MCNC: 175 MG/DL
HDLC SERPL-MCNC: 55 MG/DL
LDLC SERPL CALC-MCNC: 106 MG/DL
NONHDLC SERPL-MCNC: 120 MG/DL
TRIGL SERPL-MCNC: 70 MG/DL

## 2022-12-15 ENCOUNTER — RX RENEWAL (OUTPATIENT)
Age: 47
End: 2022-12-15

## 2023-01-10 ENCOUNTER — APPOINTMENT (OUTPATIENT)
Dept: INTERNAL MEDICINE | Facility: CLINIC | Age: 48
End: 2023-01-10
Payer: MEDICAID

## 2023-01-10 VITALS
OXYGEN SATURATION: 97 % | BODY MASS INDEX: 27.82 KG/M2 | TEMPERATURE: 98.6 F | DIASTOLIC BLOOD PRESSURE: 74 MMHG | WEIGHT: 157 LBS | SYSTOLIC BLOOD PRESSURE: 120 MMHG | HEART RATE: 63 BPM | HEIGHT: 63 IN

## 2023-01-10 DIAGNOSIS — M79.643 PAIN IN UNSPECIFIED HAND: ICD-10-CM

## 2023-01-10 DIAGNOSIS — B00.1 HERPESVIRAL VESICULAR DERMATITIS: ICD-10-CM

## 2023-01-10 LAB
ALBUMIN SERPL ELPH-MCNC: 4.6 G/DL
ALP BLD-CCNC: 100 U/L
ALT SERPL-CCNC: 15 U/L
ANION GAP SERPL CALC-SCNC: 11 MMOL/L
AST SERPL-CCNC: 22 U/L
BASOPHILS # BLD AUTO: 0.02 K/UL
BASOPHILS NFR BLD AUTO: 0.4 %
BILIRUB SERPL-MCNC: 0.2 MG/DL
BUN SERPL-MCNC: 25 MG/DL
CALCIUM SERPL-MCNC: 9.5 MG/DL
CHLORIDE SERPL-SCNC: 104 MMOL/L
CO2 SERPL-SCNC: 26 MMOL/L
CREAT SERPL-MCNC: 0.81 MG/DL
EGFR: 89 ML/MIN/1.73M2
EOSINOPHIL # BLD AUTO: 0.17 K/UL
EOSINOPHIL NFR BLD AUTO: 3.4 %
ESTIMATED AVERAGE GLUCOSE: 126 MG/DL
GLUCOSE SERPL-MCNC: 114 MG/DL
HBA1C MFR BLD HPLC: 6 %
HCT VFR BLD CALC: 38.4 %
HGB BLD-MCNC: 12.5 G/DL
IMM GRANULOCYTES NFR BLD AUTO: 0.4 %
LYMPHOCYTES # BLD AUTO: 2.15 K/UL
LYMPHOCYTES NFR BLD AUTO: 43.3 %
MAN DIFF?: NORMAL
MCHC RBC-ENTMCNC: 29.2 PG
MCHC RBC-ENTMCNC: 32.6 GM/DL
MCV RBC AUTO: 89.7 FL
MONOCYTES # BLD AUTO: 0.38 K/UL
MONOCYTES NFR BLD AUTO: 7.7 %
NEUTROPHILS # BLD AUTO: 2.22 K/UL
NEUTROPHILS NFR BLD AUTO: 44.8 %
PLATELET # BLD AUTO: 275 K/UL
POTASSIUM SERPL-SCNC: 4.6 MMOL/L
PROT SERPL-MCNC: 7.1 G/DL
RBC # BLD: 4.28 M/UL
RBC # FLD: 12.8 %
SODIUM SERPL-SCNC: 142 MMOL/L
T4 FREE SERPL-MCNC: 1.5 NG/DL
TSH SERPL-ACNC: 0.97 UIU/ML
WBC # FLD AUTO: 4.96 K/UL

## 2023-01-10 PROCEDURE — 99214 OFFICE O/P EST MOD 30 MIN: CPT

## 2023-01-10 NOTE — ASSESSMENT
[FreeTextEntry1] : 1.right hand and shoulder pain: Given referral to new physical therapy.\par \par 2.MATILDE: Status post Botox injections, continue follow-up with urogynecology.\par \par 3.thyroid CA status post thyroidectomy with hypothyroidism: Continue on levothyroxine 88 mcg once daily.  TSH is now normal.\par \par 4.hypertension: Well-controlled on lisinopril HCTZ, now off  metoprolol, Check blood pressure daily, if greater than 150/90, call MD. Keep 2 gm low sodium diet, exercise as tolerated.\par \par 5.diabetes mellitus type 2: Hemoglobin A1c doing well at 6.  Continue on metformin 5 mg 1-2 tabs in the morning and 1 tablet in the afternoon as needed. Pt advised to keep diabetic diet, decrease carbs and increase dietary protein intake.\par Exercise as tolerated 3-4 times a week.\par \par 6.left breast see status post lobectomy and radiation therapy: Follow-up with breast surgery and oncology at [facility], obtain records of recent mammogram, continue on anastrozole.  Recent referral done.\par \par 7.hyperlipidemia: Recent lipids doing well and Patient advised on low cholesterol diet-decrease in white carbs and exercise 150 minutes per week.\par

## 2023-01-10 NOTE — HISTORY OF PRESENT ILLNESS
[FreeTextEntry1] : FU [de-identified] : EVERT MARTIN is a 47 year F who comes in for a follow up visit.\par Pt with hx of MATILDE s/p botox injection, thyroid CA s/p thyroidectomy, dm-2, htn, Left breast CA s/p lumpectomy and rtx and HLD comes in for follow up visit and discuss labs. \par Discussed recent blood work today with hemoglobin A1c stable.  Patient states she never takes metformin 1000 mg twice daily and instead usually takes 1 to 2 tablets in the morning and 1 tablet as needed in the afternoon depending on her blood sugars.  If her blood sugar is less than 120 she does not take the metformin.\par She did go for physical therapy for her right hand and shoulder pain would like to try another facility.\par Patient notes a history of cold sores for about 5 years and recently had a eruption earlier today.\par Patient states she has not been taking her metoprolol for the last 2 months due to problems getting a refill on her blood pressure has been well controlled off medication.\par Patient denies any cp, sob,abdominal pain, nausea, vomiting, palpitations, fever, chills, constipation, diarrhea.\par

## 2023-01-19 ENCOUNTER — NON-APPOINTMENT (OUTPATIENT)
Age: 48
End: 2023-01-19

## 2023-01-20 ENCOUNTER — APPOINTMENT (OUTPATIENT)
Dept: UROGYNECOLOGY | Facility: CLINIC | Age: 48
End: 2023-01-20
Payer: MEDICAID

## 2023-01-20 ENCOUNTER — EMERGENCY (EMERGENCY)
Facility: HOSPITAL | Age: 48
LOS: 1 days | Discharge: HOME | End: 2023-01-20
Attending: EMERGENCY MEDICINE
Payer: MEDICAID

## 2023-01-20 ENCOUNTER — NON-APPOINTMENT (OUTPATIENT)
Age: 48
End: 2023-01-20

## 2023-01-20 ENCOUNTER — RESULT CHARGE (OUTPATIENT)
Age: 48
End: 2023-01-20

## 2023-01-20 ENCOUNTER — APPOINTMENT (OUTPATIENT)
Dept: OBGYN | Facility: CLINIC | Age: 48
End: 2023-01-20

## 2023-01-20 VITALS
SYSTOLIC BLOOD PRESSURE: 150 MMHG | RESPIRATION RATE: 16 BRPM | OXYGEN SATURATION: 99 % | TEMPERATURE: 98 F | HEART RATE: 65 BPM | DIASTOLIC BLOOD PRESSURE: 65 MMHG

## 2023-01-20 VITALS
SYSTOLIC BLOOD PRESSURE: 161 MMHG | HEART RATE: 67 BPM | DIASTOLIC BLOOD PRESSURE: 91 MMHG | OXYGEN SATURATION: 99 % | WEIGHT: 160.06 LBS | TEMPERATURE: 98 F | RESPIRATION RATE: 16 BRPM

## 2023-01-20 DIAGNOSIS — Z98.890 OTHER SPECIFIED POSTPROCEDURAL STATES: Chronic | ICD-10-CM

## 2023-01-20 DIAGNOSIS — Z90.710 ACQUIRED ABSENCE OF BOTH CERVIX AND UTERUS: Chronic | ICD-10-CM

## 2023-01-20 DIAGNOSIS — N39.46 MIXED INCONTINENCE: ICD-10-CM

## 2023-01-20 DIAGNOSIS — E89.0 POSTPROCEDURAL HYPOTHYROIDISM: Chronic | ICD-10-CM

## 2023-01-20 LAB
APPEARANCE UR: ABNORMAL
BILIRUB UR QL STRIP: NEGATIVE
BILIRUB UR-MCNC: NEGATIVE — SIGNIFICANT CHANGE UP
CLARITY UR: CLEAR
COLLECTION METHOD: NORMAL
COLOR SPEC: ABNORMAL
DIFF PNL FLD: ABNORMAL
EPI CELLS # UR: SIGNIFICANT CHANGE UP
GLUCOSE UR QL: NEGATIVE MG/DL — SIGNIFICANT CHANGE UP
GLUCOSE UR-MCNC: NEGATIVE
HCG UR QL: 0.2 EU/DL
HCG UR QL: NEGATIVE — SIGNIFICANT CHANGE UP
HGB UR QL STRIP.AUTO: NORMAL
KETONES UR-MCNC: ABNORMAL
KETONES UR-MCNC: NEGATIVE
LEUKOCYTE ESTERASE UR QL STRIP: NEGATIVE
LEUKOCYTE ESTERASE UR-ACNC: ABNORMAL
NITRITE UR QL STRIP: NEGATIVE
NITRITE UR-MCNC: NEGATIVE — SIGNIFICANT CHANGE UP
PH UR STRIP: 5
PH UR: 6.5 — SIGNIFICANT CHANGE UP (ref 5–8)
PROT UR STRIP-MCNC: NEGATIVE
PROT UR-MCNC: 100
RBC CASTS # UR COMP ASSIST: ABNORMAL /HPF (ref 0–4)
SP GR SPEC: 1.01 — SIGNIFICANT CHANGE UP (ref 1.01–1.02)
SP GR UR STRIP: 1.01
UROBILINOGEN FLD QL: NEGATIVE MG/DL — SIGNIFICANT CHANGE UP
WBC UR QL: SIGNIFICANT CHANGE UP /HPF (ref 0–5)

## 2023-01-20 PROCEDURE — 99284 EMERGENCY DEPT VISIT MOD MDM: CPT

## 2023-01-20 PROCEDURE — 52287 CYSTOSCOPY CHEMODENERVATION: CPT

## 2023-01-20 NOTE — ED ADULT TRIAGE NOTE - CHIEF COMPLAINT QUOTE
Ambulatory to ED c/o hematuria beginning today after getting botox injections at a urology office for incontinence. Patient reports dark blood in urine and painful urination however denies abdominal pain/discomfort while not urinating.

## 2023-01-20 NOTE — ED ADULT NURSE NOTE - CAS ELECT INFOMATION PROVIDED
DC instructions given by MD and verbalized understanding. Pt remains alert and O x4. NAD noted. Resp E/U. Pt remains pain free./DC instructions

## 2023-01-20 NOTE — ED ADULT NURSE NOTE - CHIEF COMPLAINT
This note was copied from a baby's chart. LC in to re-visit mother. LC able to wake baby by rubbing his back. Baby put to breast. He did not want to latch on . Mother has short nipples. Nipple shield used. Baby did latch on and had several good sucking bursts with the nipple shield. Mother able to take shield off and put baby directly on breast. Baby needed some stimulation to suckle at times. Baby nursed for 8 minutes then fell asleep. The patient is a 47y Female complaining of hematuria.

## 2023-01-21 ENCOUNTER — EMERGENCY (EMERGENCY)
Facility: HOSPITAL | Age: 48
LOS: 1 days | Discharge: HOME | End: 2023-01-21
Attending: EMERGENCY MEDICINE
Payer: MEDICAID

## 2023-01-21 VITALS
WEIGHT: 154.98 LBS | TEMPERATURE: 98 F | DIASTOLIC BLOOD PRESSURE: 87 MMHG | SYSTOLIC BLOOD PRESSURE: 138 MMHG | OXYGEN SATURATION: 98 % | HEART RATE: 61 BPM | RESPIRATION RATE: 19 BRPM | HEIGHT: 62 IN

## 2023-01-21 DIAGNOSIS — Z98.890 OTHER SPECIFIED POSTPROCEDURAL STATES: Chronic | ICD-10-CM

## 2023-01-21 DIAGNOSIS — Z90.710 ACQUIRED ABSENCE OF BOTH CERVIX AND UTERUS: Chronic | ICD-10-CM

## 2023-01-21 DIAGNOSIS — E89.0 POSTPROCEDURAL HYPOTHYROIDISM: Chronic | ICD-10-CM

## 2023-01-21 PROCEDURE — 99283 EMERGENCY DEPT VISIT LOW MDM: CPT

## 2023-01-21 PROCEDURE — 81001 URINALYSIS AUTO W/SCOPE: CPT

## 2023-01-21 PROCEDURE — 99284 EMERGENCY DEPT VISIT MOD MDM: CPT

## 2023-01-21 PROCEDURE — 81025 URINE PREGNANCY TEST: CPT

## 2023-01-21 PROCEDURE — 87086 URINE CULTURE/COLONY COUNT: CPT

## 2023-01-21 PROCEDURE — 51702 INSERT TEMP BLADDER CATH: CPT

## 2023-01-21 NOTE — ED STATDOCS - ATTENDING APP SHARED VISIT CONTRIBUTION OF CARE
Madeline: I performed a face to face bedside interview with patient regarding history of present illness, review of symptoms and past medical history. I completed an independent physical exam and ordered tests/medications as needed.  I have discussed patient's plan of care with advanced care provider. The advanced care provider assisted in  executing the discussed plan. I was available for any questions or issues that may have arose during the execution of the plan of care.

## 2023-01-21 NOTE — ED PROVIDER NOTE - CLINICAL SUMMARY MEDICAL DECISION MAKING FREE TEXT BOX
47F presenting with hematuria s/p bladder botox injection today 2pm for urinary incontinence. Pt sates she never had any side effects with previous botox injection. 47F presenting with hematuria s/p bladder botox injection today 2pm for urinary incontinence. Pt sates she never had any side effects with previous Botox injection. UA with 25-50 RBC. UA not consistent with infection. Educated pt on side effects of bladder botox that can cause bleeding. Discussed with pt to return if unable to urinate or if bleeding or clotting worsens.

## 2023-01-21 NOTE — ED PROVIDER NOTE - ATTENDING APP SHARED VISIT CONTRIBUTION OF CARE
I, Soo Celis, performed a face to face bedside interview with this patient regarding history of present illness, review of symptoms and relevant past medical, social and family history.  I completed an independent physical examination. Medical decision making, follow-up on ordered tests (ie labs, radiologic studies) and re-evaluation of the patient's status has been communicated to the ACP.  Disposition of the patient will be based on test outcome and response to ED interventions.     Pt s/p botox injection to bladder with side effect of hematuria. not on blood thinner does note some clots but able to urinate still. mild discomfort with urination which has happened before with the procedure. had done earlier today at 2pm. no fever. no abd pain. no flank pain.     UA with moderate hematuria, patient with bloody urine but no obstruction at this time. encourage aggressive hydration. discussed armenta/3 way catheter patient states will continue to monitor and return for any issues.

## 2023-01-21 NOTE — ED ADULT TRIAGE NOTE - CHIEF COMPLAINT QUOTE
Pt arrives to ED c/o urinary retention following botox injection into bladder.  Pt seen yesterday at [facility] for Hematuria but now reports retention since 1am with clots.

## 2023-01-21 NOTE — ED ADULT NURSE NOTE - CHIEF COMPLAINT QUOTE
Pt arrives to ED c/o urinary retention following botox injection into bladder.  Pt seen yesterday at [facility] for Hematuria but now reports retention since 1am with clots.

## 2023-01-21 NOTE — ED PROVIDER NOTE - OBJECTIVE STATEMENT
47F with pmh HLD, HZTN, predm, left breast cancer (radiation and lumpectomy), thyroid cancer, S/P thyroidectomy, urinary incontinence presenting with hematuria s/p bladder Botox injection with Dr Knapp (gyn uro) today at 2pm. Pt called her urologist once she noticed blood int he urine but was told the doctor left. Pt states she initially had dysuria but is improving. Continues to have hematuria with clots per pt. Pt states she had this procedure done 2 other times, last 6 mo ago without any side effects.   Denies abdominal pain, n/v, fever, chills. 47F with pmh HLD, HTN, predm, left breast cancer (radiation and lumpectomy), thyroid cancer, S/P thyroidectomy, urinary incontinence presenting with hematuria s/p bladder Botox injection with Dr Knapp (gyn uro) today at 2pm. Pt called her urologist once she noticed blood int he urine but was told the doctor left. Pt states she initially had dysuria but is improving. Continues to have hematuria with clots per pt. Pt states she had this procedure done 2 other times, last 6 mo ago without any side effects.   Denies abdominal pain, n/v, fever, chills.

## 2023-01-21 NOTE — ED PROVIDER NOTE - NSFOLLOWUPINSTRUCTIONS_ED_ALL_ED_FT
If you are unable to urinate return to ED.   Call your urologist for follow up within 3 days.  Return for any worsening symptoms.

## 2023-01-21 NOTE — ED PROVIDER NOTE - NS ED ATTENDING STATEMENT MOD
This was a shared visit with the BRITTANEY. I reviewed and verified the documentation and independently performed the documented:

## 2023-01-21 NOTE — ED STATDOCS - OBJECTIVE STATEMENT
47F with pmh HLD, HTN, predm, left breast cancer (radiation and lumpectomy), thyroid cancer, S/P thyroidectomy, urinary incontinence presenting with hematuria s/p bladder Botox injection with Dr Knapp (gyn uro) yesterday at 2pm. Came last night for hematuria, states minimal urine since 1am. had a few bloody drops on arrival here. mild suprapubic pain, no flank pain. no vomiting or f/c. no other complaints. no a/c.

## 2023-01-21 NOTE — ED STATDOCS - CLINICAL SUMMARY MEDICAL DECISION MAKING FREE TEXT BOX
47F with pmh HLD, HTN, predm, left breast cancer (radiation and lumpectomy), thyroid cancer, S/P thyroidectomy, urinary incontinence presenting with hematuria s/p bladder Botox injection with Dr Knapp (gyn uro) yesterday at 2pm. Came last night for hematuria, states minimal urine since 1am. had a few bloody drops on arrival here. mild suprapubic pain, no flank pain. no vomiting or f/c. no other complaints. no a/c.  concern for obstruction, will place armenta, flush as needed. likely d/c with leg bag if draining and close f/u with pt's uro gyn. 47F with pmh HLD, HTN, predm, left breast cancer (radiation and lumpectomy), thyroid cancer, S/P thyroidectomy, urinary incontinence presenting with hematuria s/p bladder Botox injection with Dr Knapp (gyn uro) yesterday at 2pm. Came last night for hematuria, states minimal urine since 1am. had a few bloody drops on arrival here. mild suprapubic pain, no flank pain. no vomiting or f/c. no other complaints. no a/c.  concern for obstruction, will place armenta, flush as needed. likely d/c with leg bag if draining and close f/u with pt's uro gyn.    [facility] MAIN @ 9:30 -- Pt with 600ml relieved from bladder via armenta. Leg bad placed. No clots in armenta. Pt stable for d/c with her Urologist this week.

## 2023-01-21 NOTE — ED ADULT NURSE NOTE - OBJECTIVE STATEMENT
Patient is s/p botox in her bladder for over active bladder now with urinary retention and blood in urine.  Three way armenta catheter placed with approx 700cc of dark brown urine output in armenta bag. patient is alert and oriented x4, no acute distress, skin is warm and dry.

## 2023-01-21 NOTE — ED PROVIDER NOTE - PATIENT PORTAL LINK FT
You can access the FollowMyHealth Patient Portal offered by [facility] by registering at the following website: [email or web address]. By joining [employer]’s FollowMyHealth portal, you will also be able to view your health information using other applications (apps) compatible with our system.

## 2023-01-21 NOTE — ED STATDOCS - PATIENT PORTAL LINK FT
You can access the FollowMyHealth Patient Portal offered by [facility] by registering at the following website: [email or web address]. By joining [employer]’s FollowMyHealth portal, you will also be able to view your health information using other applications (apps) compatible with our system.

## 2023-01-21 NOTE — ED STATDOCS - PHYSICAL EXAMINATION
Gen: No acute distress, non toxic  HEENT: Mucous membranes moist, pink conjunctivae, EOMI  CV: RRR, nl s1/s2.  Resp: CTAB, normal rate and effort  GI: mild suprapubic discomfort.   : No CVAT  Neuro: A&O x 3, moving all 4 extremities  MSK: No spine or joint tenderness to palpation  Skin: No rashes. intact and perfused.

## 2023-01-22 ENCOUNTER — NON-APPOINTMENT (OUTPATIENT)
Age: 48
End: 2023-01-22

## 2023-01-22 LAB
CULTURE RESULTS: NO GROWTH — SIGNIFICANT CHANGE UP
SPECIMEN SOURCE: SIGNIFICANT CHANGE UP

## 2023-01-23 ENCOUNTER — APPOINTMENT (OUTPATIENT)
Dept: UROGYNECOLOGY | Facility: CLINIC | Age: 48
End: 2023-01-23
Payer: MEDICAID

## 2023-01-23 PROCEDURE — 99213 OFFICE O/P EST LOW 20 MIN: CPT

## 2023-01-23 NOTE — DISCUSSION/SUMMARY
[Post-Op instructions given. Pt/family verbalizes understanding] : post-operative instructions were provided to the patient/family who verbalize understanding [Risks/Benefits discussed. Pt/family verbalizes understanding] : risks and benefits of the procedure were discussed with the patient/family who verbalize understanding [FreeTextEntry1] : \par During voiding trial 250 ml of sterile water instilled, she had an urge to void, Parada was D/C, she voided with ease 280 ml on a commode.\par \par Instruction on postprocedural retention provided, Rx for Macrobid ppx sent to the pharmacy\par \par Encouraged pt to call with any questions or concerns\par \par RTC in 1 week for PVR check or sooner if need arises \par \par \par

## 2023-01-23 NOTE — ASSESSMENT
[FreeTextEntry1] : Bekah presents for TOV s/p bladder Botox, she had gross hematuria and went to the ED with retention,\par She was discharged with Parada \par Today reporting doing well, urine is clear, no fever, pain, N/V

## 2023-02-01 ENCOUNTER — APPOINTMENT (OUTPATIENT)
Dept: UROGYNECOLOGY | Facility: CLINIC | Age: 48
End: 2023-02-01

## 2023-02-05 ENCOUNTER — NON-APPOINTMENT (OUTPATIENT)
Age: 48
End: 2023-02-05

## 2023-02-06 ENCOUNTER — APPOINTMENT (OUTPATIENT)
Dept: UROGYNECOLOGY | Facility: CLINIC | Age: 48
End: 2023-02-06
Payer: MEDICAID

## 2023-02-06 VITALS
HEART RATE: 64 BPM | DIASTOLIC BLOOD PRESSURE: 80 MMHG | TEMPERATURE: 98 F | OXYGEN SATURATION: 100 % | SYSTOLIC BLOOD PRESSURE: 157 MMHG

## 2023-02-06 PROCEDURE — 51798 US URINE CAPACITY MEASURE: CPT | Mod: 59

## 2023-02-06 PROCEDURE — 99213 OFFICE O/P EST LOW 20 MIN: CPT | Mod: 25

## 2023-02-07 NOTE — PHYSICAL EXAM
[No Acute Distress] : in no acute distress [Well developed] : well developed [Well Nourished] : ~L well nourished [Good Hygeine] : demonstrates good hygeine [Oriented x3] : oriented to person, place, and time [Normal Memory] : ~T memory was ~L unimpaired [Normal Mood/Affect] : mood and affect are normal [Normal Appearance] : ~T the appearance of the neck was normal [No Edema] : ~T edema was not present [Soft, Nontender] : the abdomen was soft and nontender [Warm and Dry] : was warm and dry to touch [Normal Gait] : gait was normal [Cough] : no cough

## 2023-02-07 NOTE — DISCUSSION/SUMMARY
[FreeTextEntry1] : PVR 23ml today.  She is happy with treatment.  She will schedule repeat injections for 6 months.  Instructed to call with any questions or concerns and she verbalizes understanding.

## 2023-02-07 NOTE — HISTORY OF PRESENT ILLNESS
[FreeTextEntry1] : Pt is s/p 100 units intradetrusor botox on 1/20/23.  She reports about 80% improvement in her symptoms and she is very happy with this.  She was seen in ED for episode of gods hematuria and then urinary retention.  She passed TOV in office on 1/23/22 and today she reports doing very well.  She denies any subjective feelings of incomplete emptying.  Denies any dysuria or UTI symptoms.  This is her 4th round of botox and treatments and she usually has symptom improvement for 6-9 months.

## 2023-02-09 ENCOUNTER — APPOINTMENT (OUTPATIENT)
Dept: OBGYN | Facility: CLINIC | Age: 48
End: 2023-02-09
Payer: MEDICAID

## 2023-02-09 VITALS
BODY MASS INDEX: 27.64 KG/M2 | HEIGHT: 63 IN | HEART RATE: 75 BPM | SYSTOLIC BLOOD PRESSURE: 110 MMHG | WEIGHT: 156 LBS | DIASTOLIC BLOOD PRESSURE: 70 MMHG | RESPIRATION RATE: 14 BRPM

## 2023-02-09 DIAGNOSIS — Z01.419 ENCOUNTER FOR GYNECOLOGICAL EXAMINATION (GENERAL) (ROUTINE) W/OUT ABNORMAL FINDINGS: ICD-10-CM

## 2023-02-09 PROCEDURE — 82270 OCCULT BLOOD FECES: CPT

## 2023-02-09 PROCEDURE — 99396 PREV VISIT EST AGE 40-64: CPT

## 2023-02-09 NOTE — PLAN
[FreeTextEntry1] : 46 YO FEMALE PRESENTS FOR ANNUAL GYN EXAMINATION. SELF BREAST EXAMINATION TAUGHT. MAMMOGRAM ORDERED. EXERCISE, CALCIUM AND VITAMIN D3 SUPPLEMENTATION DISCUSSED. BONE DENSITY STUDY AND INDICATIONS REVIEWED. COLONOSCOPY HISTORY REVIEWED AND DISCUSSED FOLLOWUP.

## 2023-03-06 LAB — CYTOLOGY CVX/VAG DOC THIN PREP: ABNORMAL

## 2023-04-08 LAB
CHOLEST SERPL-MCNC: 212 MG/DL
HDLC SERPL-MCNC: 78 MG/DL
LDLC SERPL CALC-MCNC: 119 MG/DL
NONHDLC SERPL-MCNC: 134 MG/DL
TRIGL SERPL-MCNC: 74 MG/DL

## 2023-04-10 ENCOUNTER — NON-APPOINTMENT (OUTPATIENT)
Age: 48
End: 2023-04-10

## 2023-06-20 ENCOUNTER — APPOINTMENT (OUTPATIENT)
Dept: INTERNAL MEDICINE | Facility: CLINIC | Age: 48
End: 2023-06-20
Payer: MEDICAID

## 2023-06-20 VITALS
TEMPERATURE: 98.6 F | WEIGHT: 165 LBS | DIASTOLIC BLOOD PRESSURE: 62 MMHG | OXYGEN SATURATION: 98 % | SYSTOLIC BLOOD PRESSURE: 110 MMHG | RESPIRATION RATE: 16 BRPM | BODY MASS INDEX: 29.23 KG/M2 | HEIGHT: 63 IN | HEART RATE: 79 BPM

## 2023-06-20 DIAGNOSIS — C73 MALIGNANT NEOPLASM OF THYROID GLAND: ICD-10-CM

## 2023-06-20 PROCEDURE — 99214 OFFICE O/P EST MOD 30 MIN: CPT

## 2023-06-20 RX ORDER — NITROFURANTOIN (MONOHYDRATE/MACROCRYSTALS) 25; 75 MG/1; MG/1
100 CAPSULE ORAL
Qty: 6 | Refills: 0 | Status: DISCONTINUED | COMMUNITY
Start: 2023-01-23 | End: 2023-06-20

## 2023-06-20 RX ORDER — ACYCLOVIR 50 MG/G
5 CREAM TOPICAL
Qty: 1 | Refills: 1 | Status: DISCONTINUED | COMMUNITY
Start: 2023-01-10 | End: 2023-06-20

## 2023-06-20 RX ORDER — METOPROLOL SUCCINATE 25 MG/1
25 TABLET, EXTENDED RELEASE ORAL DAILY
Qty: 90 | Refills: 1 | Status: DISCONTINUED | COMMUNITY
End: 2023-06-20

## 2023-06-20 NOTE — HISTORY OF PRESENT ILLNESS
[FreeTextEntry1] : FU [de-identified] : EVERT MARTIN is a 48 year F who comes in for a follow up visit.\par Pt went to [place] and did not keep track of diet and has gained 10 lbs since last visit. Since coming back to [place] she has been back/forth with her diet. Her BG have been in the 120s in the morning. \par She follows with Gabrielle Jung breast sx and mammogram in 11/22 was negative. She did see Gyn in 11/22 and pap smear negative and BMD negative 2022. She did have colonoscopy 2 weeks ago that showed hemorrhoids with gabrielle lezama and repeat recommended in 10 yrs.\par Patient states that she has a history of peanut allergy with lip swelling but 1 in [place] she was able to eat foods that contain peanuts in it.\par Patient denies any cp, sob,abdominal pain, nausea, vomiting, palpitations, fever, chills, constipation, diarrhea.\par

## 2023-06-20 NOTE — ASSESSMENT
[FreeTextEntry1] : 1.diabetes mellitus type 2: With recent 10 pound weight gain due to lack of dietary control.  Currently on metformin 500 mg once daily.  Recheck hemoglobin A1c. Pt advised to keep diabetic diet, decrease carbs and increase dietary protein intake.\par Exercise as tolerated 3-4 times a week.\par \par 2.  History of peanut allergy: Given referral to new allergist as recently was able to tolerate peanuts and cake.\par \par 3.thyroid CA status post thyroidectomy with hypothyroidism: Continue on levothyroxine 88 mcg once daily.  Check TFTs\par \par 4.hypertension: Well-controlled on lisinopril HCTZ, now off  metoprolol, Check blood pressure daily, if greater than 150/90, call MD. Keep 2 gm low sodium diet, exercise as tolerated.\par \par 5.left breast see status post lobectomy and radiation therapy: Follow-up with breast surgery and oncology at [place], obtain records of recent mammogram, continue on anastrozole.  \par \par 6.hyperlipidemia: Recheck fasting lipids and Patient advised on low cholesterol diet-decrease in white carbs and exercise 150 minutes per week.\par \par 7.health maintenance: Recently had mammogram and Pap smear and bone density negative at [place] last year and colonoscopy 2 weeks ago negative per patient repeat in 10 years obtain records.

## 2023-07-11 LAB
ALBUMIN SERPL ELPH-MCNC: 4.7 G/DL
ALP BLD-CCNC: 79 U/L
ALT SERPL-CCNC: 14 U/L
ANION GAP SERPL CALC-SCNC: 14 MMOL/L
AST SERPL-CCNC: 26 U/L
BILIRUB SERPL-MCNC: 0.4 MG/DL
BUN SERPL-MCNC: 31 MG/DL
CALCIUM SERPL-MCNC: 9.3 MG/DL
CHLORIDE SERPL-SCNC: 107 MMOL/L
CHOLEST SERPL-MCNC: 220 MG/DL
CO2 SERPL-SCNC: 22 MMOL/L
CREAT SERPL-MCNC: 0.79 MG/DL
EGFR: 91 ML/MIN/1.73M2
ESTIMATED AVERAGE GLUCOSE: 137 MG/DL
GLUCOSE SERPL-MCNC: 112 MG/DL
HBA1C MFR BLD HPLC: 6.4 %
HDLC SERPL-MCNC: 58 MG/DL
LDLC SERPL CALC-MCNC: 141 MG/DL
NONHDLC SERPL-MCNC: 162 MG/DL
POTASSIUM SERPL-SCNC: 4.7 MMOL/L
PROT SERPL-MCNC: 7 G/DL
SODIUM SERPL-SCNC: 143 MMOL/L
T4 FREE SERPL-MCNC: 1.5 NG/DL
TRIGL SERPL-MCNC: 108 MG/DL
TSH SERPL-ACNC: 0.3 UIU/ML

## 2023-07-13 ENCOUNTER — NON-APPOINTMENT (OUTPATIENT)
Age: 48
End: 2023-07-13

## 2023-07-24 ENCOUNTER — RX RENEWAL (OUTPATIENT)
Age: 48
End: 2023-07-24

## 2023-08-03 RX ORDER — LEVOTHYROXINE SODIUM 0.09 MG/1
88 TABLET ORAL
Qty: 90 | Refills: 1 | Status: DISCONTINUED | COMMUNITY
Start: 2022-08-12 | End: 2023-08-03

## 2023-08-11 ENCOUNTER — APPOINTMENT (OUTPATIENT)
Dept: UROGYNECOLOGY | Facility: CLINIC | Age: 48
End: 2023-08-11
Payer: MEDICAID

## 2023-08-11 PROCEDURE — 52287 CYSTOSCOPY CHEMODENERVATION: CPT

## 2023-08-28 ENCOUNTER — APPOINTMENT (OUTPATIENT)
Dept: UROGYNECOLOGY | Facility: CLINIC | Age: 48
End: 2023-08-28
Payer: MEDICAID

## 2023-08-28 VITALS — OXYGEN SATURATION: 97 % | DIASTOLIC BLOOD PRESSURE: 75 MMHG | SYSTOLIC BLOOD PRESSURE: 132 MMHG | HEART RATE: 61 BPM

## 2023-08-28 PROCEDURE — 99212 OFFICE O/P EST SF 10 MIN: CPT

## 2023-08-28 PROCEDURE — 51798 US URINE CAPACITY MEASURE: CPT

## 2023-08-28 NOTE — PHYSICAL EXAM
[No Acute Distress] : in no acute distress [Well developed] : well developed [Well Nourished] : ~L well nourished [Good Hygeine] : demonstrates good hygeine [Oriented x3] : oriented to person, place, and time [Normal Memory] : ~T memory was ~L unimpaired [Normal Mood/Affect] : mood and affect are normal [Cough] : no cough [Warm and Dry] : was warm and dry to touch [Normal Gait] : gait was normal

## 2023-08-28 NOTE — DISCUSSION/SUMMARY
[FreeTextEntry1] : Pt is happy with Botox therapy. She will schedule repeat injection 6 months from now and will call to postpone if symptoms are well controlled. F/U as needed. Instructed to call if she develops symptoms of UTI or with any questions or concerns and she verbalizes understanding.

## 2023-08-28 NOTE — HISTORY OF PRESENT ILLNESS
[FreeTextEntry1] : Bekah is s/p 100 units Intradetrusor Botox on 08/11/23.  She reports about 50% improvement in her symptoms and is happy with this. Pt states has noticed less urinary leaking and is able to hold urine longer. Pt reports this botox treatment, so far, is much better than last time where she had bleeding and urinary retention. Today, she denies any hematuria, subjective feelings of incomplete emptying and denies any dysuria.  PVR normal today.  This is her 5th round of botox injection, and she states she usually has symptom improvement for about 6-7 months.

## 2023-09-08 ENCOUNTER — RX RENEWAL (OUTPATIENT)
Age: 48
End: 2023-09-08

## 2023-09-19 ENCOUNTER — APPOINTMENT (OUTPATIENT)
Dept: INTERNAL MEDICINE | Facility: CLINIC | Age: 48
End: 2023-09-19

## 2023-09-21 NOTE — PATIENT PROFILE ADULT - DO YOU FEEL UNSAFE AT SCHOOL?
Cardiac cath for Dr. Madera with Dr. Hamilton 9/22/2023 at 0830 with 0630 arrival.  Right radial access.  Dx: Abnormal stress test, pre-operative clearance.  
no

## 2023-09-26 LAB
APPEARANCE: CLEAR
BACTERIA: NEGATIVE /HPF
BILIRUBIN URINE: NEGATIVE
BLOOD URINE: NEGATIVE
CAST: 0 /LPF
COLOR: YELLOW
EPITHELIAL CELLS: 1 /HPF
GLUCOSE QUALITATIVE U: NEGATIVE MG/DL
KETONES URINE: NEGATIVE MG/DL
LEUKOCYTE ESTERASE URINE: NEGATIVE
MICROSCOPIC-UA: NORMAL
NITRITE URINE: NEGATIVE
PH URINE: 6.5
PROTEIN URINE: NEGATIVE MG/DL
RED BLOOD CELLS URINE: 0 /HPF
SPECIFIC GRAVITY URINE: 1.01
UROBILINOGEN URINE: 0.2 MG/DL
WHITE BLOOD CELLS URINE: 1 /HPF

## 2023-10-27 ENCOUNTER — RX RENEWAL (OUTPATIENT)
Age: 48
End: 2023-10-27

## 2023-10-31 RX ORDER — EPINEPHRINE 0.3 MG/.3ML
0.3 INJECTION INTRAMUSCULAR
Qty: 1 | Refills: 1 | Status: ACTIVE | COMMUNITY
Start: 2023-10-31 | End: 1900-01-01

## 2023-11-01 ENCOUNTER — APPOINTMENT (OUTPATIENT)
Dept: INTERNAL MEDICINE | Facility: CLINIC | Age: 48
End: 2023-11-01
Payer: COMMERCIAL

## 2023-11-01 VITALS
HEIGHT: 63 IN | TEMPERATURE: 98.2 F | DIASTOLIC BLOOD PRESSURE: 72 MMHG | BODY MASS INDEX: 28.53 KG/M2 | OXYGEN SATURATION: 93 % | SYSTOLIC BLOOD PRESSURE: 112 MMHG | RESPIRATION RATE: 16 BRPM | HEART RATE: 60 BPM | WEIGHT: 161 LBS

## 2023-11-01 DIAGNOSIS — M25.511 PAIN IN RIGHT SHOULDER: ICD-10-CM

## 2023-11-01 LAB
CHOLEST SERPL-MCNC: 198 MG/DL
ESTIMATED AVERAGE GLUCOSE: 128 MG/DL
HBA1C MFR BLD HPLC: 6.1 %
HDLC SERPL-MCNC: 60 MG/DL
LDLC SERPL CALC-MCNC: 121 MG/DL
NONHDLC SERPL-MCNC: 137 MG/DL
TRIGL SERPL-MCNC: 90 MG/DL

## 2023-11-01 PROCEDURE — 99214 OFFICE O/P EST MOD 30 MIN: CPT

## 2023-11-01 RX ORDER — NITROFURANTOIN (MONOHYDRATE/MACROCRYSTALS) 25; 75 MG/1; MG/1
100 CAPSULE ORAL
Qty: 14 | Refills: 0 | Status: DISCONTINUED | COMMUNITY
Start: 2023-09-25 | End: 2023-11-01

## 2023-11-01 RX ORDER — NITROFURANTOIN (MONOHYDRATE/MACROCRYSTALS) 25; 75 MG/1; MG/1
100 CAPSULE ORAL
Qty: 10 | Refills: 0 | Status: DISCONTINUED | COMMUNITY
Start: 2023-01-12 | End: 2023-11-01

## 2023-11-01 RX ORDER — MULTIVIT-MIN/FOLIC/VIT K/LYCOP 400-300MCG
25 MCG TABLET ORAL
Qty: 90 | Refills: 1 | Status: ACTIVE | COMMUNITY
Start: 2022-02-23 | End: 1900-01-01

## 2023-11-03 ENCOUNTER — NON-APPOINTMENT (OUTPATIENT)
Age: 48
End: 2023-11-03

## 2023-11-03 LAB
25(OH)D3 SERPL-MCNC: 27 NG/ML
ALBUMIN SERPL ELPH-MCNC: 4.9 G/DL
ALP BLD-CCNC: 95 U/L
ALT SERPL-CCNC: 12 U/L
ANION GAP SERPL CALC-SCNC: 12 MMOL/L
AST SERPL-CCNC: 25 U/L
BASOPHILS # BLD AUTO: 0.02 K/UL
BASOPHILS NFR BLD AUTO: 0.4 %
BILIRUB SERPL-MCNC: 0.2 MG/DL
BUN SERPL-MCNC: 24 MG/DL
CALCIUM SERPL-MCNC: 10 MG/DL
CHLORIDE SERPL-SCNC: 99 MMOL/L
CO2 SERPL-SCNC: 26 MMOL/L
CREAT SERPL-MCNC: 0.84 MG/DL
EGFR: 85 ML/MIN/1.73M2
EOSINOPHIL # BLD AUTO: 0.11 K/UL
EOSINOPHIL NFR BLD AUTO: 2.3 %
GLUCOSE SERPL-MCNC: 110 MG/DL
HCT VFR BLD CALC: 38.3 %
HGB BLD-MCNC: 12.3 G/DL
IMM GRANULOCYTES NFR BLD AUTO: 0.4 %
LYMPHOCYTES # BLD AUTO: 2.12 K/UL
LYMPHOCYTES NFR BLD AUTO: 44 %
MAN DIFF?: NORMAL
MCHC RBC-ENTMCNC: 28.2 PG
MCHC RBC-ENTMCNC: 32.1 GM/DL
MCV RBC AUTO: 87.8 FL
MONOCYTES # BLD AUTO: 0.37 K/UL
MONOCYTES NFR BLD AUTO: 7.7 %
NEUTROPHILS # BLD AUTO: 2.18 K/UL
NEUTROPHILS NFR BLD AUTO: 45.2 %
PLATELET # BLD AUTO: 271 K/UL
POTASSIUM SERPL-SCNC: 4.3 MMOL/L
PROT SERPL-MCNC: 7.7 G/DL
RBC # BLD: 4.36 M/UL
RBC # FLD: 12.1 %
SODIUM SERPL-SCNC: 138 MMOL/L
WBC # FLD AUTO: 4.82 K/UL

## 2023-12-01 ENCOUNTER — RX RENEWAL (OUTPATIENT)
Age: 48
End: 2023-12-01

## 2023-12-14 ENCOUNTER — APPOINTMENT (OUTPATIENT)
Dept: INTERNAL MEDICINE | Facility: CLINIC | Age: 48
End: 2023-12-14
Payer: COMMERCIAL

## 2023-12-14 VITALS
HEIGHT: 63 IN | BODY MASS INDEX: 28.35 KG/M2 | SYSTOLIC BLOOD PRESSURE: 118 MMHG | DIASTOLIC BLOOD PRESSURE: 70 MMHG | TEMPERATURE: 98.8 F | OXYGEN SATURATION: 97 % | HEART RATE: 71 BPM | WEIGHT: 160 LBS

## 2023-12-14 DIAGNOSIS — J34.89 OTHER SPECIFIED DISORDERS OF NOSE AND NASAL SINUSES: ICD-10-CM

## 2023-12-14 PROCEDURE — 99213 OFFICE O/P EST LOW 20 MIN: CPT

## 2023-12-14 NOTE — HISTORY OF PRESENT ILLNESS
[FreeTextEntry8] : 48 year female presents to the office with complaints of nasal congestion, headache and sinus pressure. Patient reports Friday woke up with fatigue, temp 100 and vomiting. Rested and stayed hydrated, Saturday and Sunday reports felt well, all symptoms resolved. Reports congestion, headache, slight throat pain and sinus pressure started Monday. Has been using OTC Advil cold and sinus with some relief. Yesterday felt worse, increased congestion, sinus pressure, non-productive dry cough, yellow to green nasal drainage. Throat pain resolved, denies fever or chills. Today feels slightly better than yesterday. Denies chest pain, SOB, palpitations or ear pain. Patient reports she is travelling out of the country next Friday.

## 2023-12-14 NOTE — PHYSICAL EXAM
[Normal Outer Ear/Nose] : the outer ears and nose were normal in appearance [Normal Oropharynx] : the oropharynx was normal [Normal TMs] : both tympanic membranes were normal [Normal] : normal rate, regular rhythm, normal S1 and S2 and no murmur heard [Normal Gait] : normal gait [de-identified] : +Nasal congestion, swollen turbinates, No pharyngeal erythema or exudate, no sinus tenderness. [de-identified] :  No wheezes, rhonchi, rales.

## 2023-12-14 NOTE — REVIEW OF SYSTEMS
[Nasal Discharge] : nasal discharge [Postnasal Drip] : postnasal drip [Headache] : headache [Negative] : Gastrointestinal [Earache] : no earache [Hearing Loss] : no hearing loss [Nosebleed] : no nosebleeds [Hoarseness] : no hoarseness [Sore Throat] : no sore throat [Dizziness] : no dizziness [FreeTextEntry4] : Sinus pressure and pain.

## 2024-01-03 ENCOUNTER — NON-APPOINTMENT (OUTPATIENT)
Age: 49
End: 2024-01-03

## 2024-01-03 DIAGNOSIS — R05.8 OTHER SPECIFIED COUGH: ICD-10-CM

## 2024-01-03 DIAGNOSIS — R05.1 ACUTE COUGH: ICD-10-CM

## 2024-02-12 ENCOUNTER — NON-APPOINTMENT (OUTPATIENT)
Age: 49
End: 2024-02-12

## 2024-02-16 ENCOUNTER — APPOINTMENT (OUTPATIENT)
Dept: UROGYNECOLOGY | Facility: CLINIC | Age: 49
End: 2024-02-16
Payer: COMMERCIAL

## 2024-02-16 LAB
BILIRUB UR QL STRIP: NEGATIVE
CLARITY UR: CLEAR
COLLECTION METHOD: NORMAL
GLUCOSE UR-MCNC: NEGATIVE
HCG UR QL: 0.2 EU/DL
HGB UR QL STRIP.AUTO: NEGATIVE
KETONES UR-MCNC: NEGATIVE
LEUKOCYTE ESTERASE UR QL STRIP: NEGATIVE
NITRITE UR QL STRIP: NEGATIVE
PH UR STRIP: 7.5
PROT UR STRIP-MCNC: NEGATIVE
SP GR UR STRIP: 1.01

## 2024-02-16 PROCEDURE — 81003 URINALYSIS AUTO W/O SCOPE: CPT | Mod: QW

## 2024-02-16 PROCEDURE — 52287 CYSTOSCOPY CHEMODENERVATION: CPT

## 2024-02-23 ENCOUNTER — APPOINTMENT (OUTPATIENT)
Dept: INTERNAL MEDICINE | Facility: CLINIC | Age: 49
End: 2024-02-23
Payer: COMMERCIAL

## 2024-02-23 DIAGNOSIS — E11.9 TYPE 2 DIABETES MELLITUS W/OUT COMPLICATIONS: ICD-10-CM

## 2024-02-23 DIAGNOSIS — E03.9 HYPOTHYROIDISM, UNSPECIFIED: ICD-10-CM

## 2024-02-23 DIAGNOSIS — I10 ESSENTIAL (PRIMARY) HYPERTENSION: ICD-10-CM

## 2024-02-23 DIAGNOSIS — E78.00 PURE HYPERCHOLESTEROLEMIA, UNSPECIFIED: ICD-10-CM

## 2024-02-23 PROCEDURE — 99214 OFFICE O/P EST MOD 30 MIN: CPT

## 2024-02-23 RX ORDER — BENZONATATE 100 MG/1
100 CAPSULE ORAL EVERY 8 HOURS
Qty: 30 | Refills: 0 | Status: DISCONTINUED | COMMUNITY
Start: 2024-01-03 | End: 2024-02-23

## 2024-02-23 RX ORDER — METHYLPREDNISOLONE 4 MG/1
4 TABLET ORAL
Qty: 1 | Refills: 0 | Status: DISCONTINUED | COMMUNITY
Start: 2024-01-03 | End: 2024-02-23

## 2024-02-23 RX ORDER — NITROFURANTOIN (MONOHYDRATE/MACROCRYSTALS) 25; 75 MG/1; MG/1
100 CAPSULE ORAL
Qty: 10 | Refills: 0 | Status: DISCONTINUED | COMMUNITY
Start: 2024-02-12 | End: 2024-02-23

## 2024-02-23 RX ORDER — LEVOTHYROXINE SODIUM 0.09 MG/1
88 TABLET ORAL
Qty: 90 | Refills: 1 | Status: ACTIVE | COMMUNITY
Start: 2023-08-03 | End: 1900-01-01

## 2024-02-23 RX ORDER — AMOXICILLIN AND CLAVULANATE POTASSIUM 875; 125 MG/1; MG/1
875-125 TABLET, COATED ORAL
Qty: 14 | Refills: 0 | Status: DISCONTINUED | COMMUNITY
Start: 2023-12-14 | End: 2024-02-23

## 2024-02-23 NOTE — PHYSICAL EXAM
[TextEntry] : General: NAD CVS: S1, S2 normal, RRR, no m/g/r Resp: CTA b/l, no wheeze/rale/rhonchi Extremities: no edema Neuro: aaox3

## 2024-02-23 NOTE — HISTORY OF PRESENT ILLNESS
[FreeTextEntry1] : FU [de-identified] : EVERT MARTIN is a 48 year F who comes in for a follow up visit. Patient with history of hypertension, hypothyroidism, DCIS, diabetes mellitus type 2, vitamin D deficiency. Patient states about 2 months ago she noticed her blood sugars after taking metformin 5 mg would go from 100-70 and felt shaky.  Therefore, she stopped taking metformin about 2 months ago.  She notes that her sugars range from 100-120 and most recently she did see some numbers around the 150s. Patient denies any cp, sob, abdominal pain, nausea, vomiting, palpitations, fever, chills, constipation, diarrhea.

## 2024-02-23 NOTE — ASSESSMENT
[FreeTextEntry1] : 1.diabetes mellitus type 2: Recently stopped metformin 500 mg due to low blood sugars 2 months ago.  Recheck hemoglobin A1c. Pt advised to keep diabetic diet, decrease carbs and increase dietary protein intake. Exercise as tolerated 3-4 times a week.  If needed she is agreeable to go back on lower dose metformin.  2.hypertension: Discussed blood work to obtain, continue on lisinopril HCTZ once daily. Check blood pressure daily, if greater than 150/90 or less than 100/50, call MD. Keep 2 gm low sodium diet, exercise as tolerated.  3.hypothyroidism: Recheck TFTs.  Continue on levothyroxine 88 mcg daily.

## 2024-02-27 LAB
25(OH)D3 SERPL-MCNC: 31.8 NG/ML
ALBUMIN SERPL ELPH-MCNC: 4.8 G/DL
ALP BLD-CCNC: 88 U/L
ALT SERPL-CCNC: 12 U/L
ANION GAP SERPL CALC-SCNC: 13 MMOL/L
AST SERPL-CCNC: 22 U/L
BASOPHILS # BLD AUTO: 0.02 K/UL
BASOPHILS NFR BLD AUTO: 0.4 %
BILIRUB SERPL-MCNC: 0.2 MG/DL
BUN SERPL-MCNC: 29 MG/DL
CALCIUM SERPL-MCNC: 9.7 MG/DL
CHLORIDE SERPL-SCNC: 101 MMOL/L
CHOLEST SERPL-MCNC: 198 MG/DL
CO2 SERPL-SCNC: 25 MMOL/L
CREAT SERPL-MCNC: 0.89 MG/DL
EGFR: 79 ML/MIN/1.73M2
EOSINOPHIL # BLD AUTO: 0.13 K/UL
EOSINOPHIL NFR BLD AUTO: 2.7 %
ESTIMATED AVERAGE GLUCOSE: 131 MG/DL
GLUCOSE SERPL-MCNC: 97 MG/DL
HBA1C MFR BLD HPLC: 6.2 %
HCT VFR BLD CALC: 36.8 %
HDLC SERPL-MCNC: 59 MG/DL
HGB BLD-MCNC: 11.9 G/DL
IMM GRANULOCYTES NFR BLD AUTO: 0.2 %
LDLC SERPL CALC-MCNC: 116 MG/DL
LYMPHOCYTES # BLD AUTO: 2.08 K/UL
LYMPHOCYTES NFR BLD AUTO: 43.1 %
MAGNESIUM SERPL-MCNC: 2.1 MG/DL
MAN DIFF?: NORMAL
MCHC RBC-ENTMCNC: 28.5 PG
MCHC RBC-ENTMCNC: 32.3 GM/DL
MCV RBC AUTO: 88 FL
MONOCYTES # BLD AUTO: 0.42 K/UL
MONOCYTES NFR BLD AUTO: 8.7 %
NEUTROPHILS # BLD AUTO: 2.17 K/UL
NEUTROPHILS NFR BLD AUTO: 44.9 %
NONHDLC SERPL-MCNC: 139 MG/DL
PLATELET # BLD AUTO: 251 K/UL
POTASSIUM SERPL-SCNC: 4.3 MMOL/L
PROT SERPL-MCNC: 7.5 G/DL
RBC # BLD: 4.18 M/UL
RBC # FLD: 12.6 %
SODIUM SERPL-SCNC: 140 MMOL/L
T4 FREE SERPL-MCNC: 1.7 NG/DL
TRIGL SERPL-MCNC: 131 MG/DL
TSH SERPL-ACNC: 0.08 UIU/ML
VIT B12 SERPL-MCNC: 745 PG/ML
WBC # FLD AUTO: 4.83 K/UL

## 2024-03-08 ENCOUNTER — APPOINTMENT (OUTPATIENT)
Dept: UROGYNECOLOGY | Facility: CLINIC | Age: 49
End: 2024-03-08
Payer: COMMERCIAL

## 2024-03-08 DIAGNOSIS — R39.15 URGENCY OF URINATION: ICD-10-CM

## 2024-03-08 DIAGNOSIS — R32 UNSPECIFIED URINARY INCONTINENCE: ICD-10-CM

## 2024-03-08 PROCEDURE — 99213 OFFICE O/P EST LOW 20 MIN: CPT

## 2024-03-08 NOTE — PHYSICAL EXAM
[Well developed] : well developed [No Acute Distress] : in no acute distress [Well Nourished] : ~L well nourished [Good Hygeine] : demonstrates good hygeine [Oriented x3] : oriented to person, place, and time [Normal Memory] : ~T memory was ~L unimpaired [Normal Mood/Affect] : mood and affect are normal [Cough] : no cough [Normal Gait] : gait was normal

## 2024-03-08 NOTE — HISTORY OF PRESENT ILLNESS
[FreeTextEntry1] : Bekah is s/p 100 units Intradetrusor Botox on 02/16/24.  She reports about 60% improvement in her symptoms and is happy with this. Pt states has noticed less urinary leaking and is able to hold urine longer. Today, she denies any hematuria, subjective feelings of incomplete emptying and denies any dysuria.  PVR normal today.  This is her 6th round of botox injection, and she states she usually has symptom improvement for about 5 months but is wondering if there is a higher dose of the botox.

## 2024-03-08 NOTE — DISCUSSION/SUMMARY
[FreeTextEntry1] : Bekah is happy with Botox therapy and notes she will discuss with Dr. Knapp on next visit if it would benefit her to trial more than 100 units for next appointment. She will schedule repeat injection 4-5 months from now and will call to postpone if symptoms are well controlled. F/U as needed. Instructed to call if she develops symptoms of UTI or with any questions or concerns and she verbalizes understanding.

## 2024-03-31 LAB
T4 FREE SERPL-MCNC: 1.3 NG/DL
TSH SERPL-ACNC: 0.31 UIU/ML

## 2024-04-02 ENCOUNTER — NON-APPOINTMENT (OUTPATIENT)
Age: 49
End: 2024-04-02

## 2024-05-05 ENCOUNTER — NON-APPOINTMENT (OUTPATIENT)
Age: 49
End: 2024-05-05

## 2024-05-17 ENCOUNTER — APPOINTMENT (OUTPATIENT)
Dept: INTERNAL MEDICINE | Facility: CLINIC | Age: 49
End: 2024-05-17
Payer: COMMERCIAL

## 2024-05-17 VITALS
TEMPERATURE: 98.2 F | HEART RATE: 52 BPM | HEIGHT: 63 IN | WEIGHT: 154 LBS | BODY MASS INDEX: 27.29 KG/M2 | OXYGEN SATURATION: 99 % | DIASTOLIC BLOOD PRESSURE: 74 MMHG | SYSTOLIC BLOOD PRESSURE: 130 MMHG

## 2024-05-17 DIAGNOSIS — R21 RASH AND OTHER NONSPECIFIC SKIN ERUPTION: ICD-10-CM

## 2024-05-17 PROCEDURE — 99214 OFFICE O/P EST MOD 30 MIN: CPT

## 2024-05-17 RX ORDER — METFORMIN HYDROCHLORIDE 500 MG/1
500 TABLET, COATED ORAL
Qty: 60 | Refills: 3 | Status: DISCONTINUED | COMMUNITY
Start: 2022-12-15 | End: 2024-05-17

## 2024-05-17 RX ORDER — MELOXICAM 15 MG/1
15 TABLET ORAL DAILY
Qty: 30 | Refills: 0 | Status: DISCONTINUED | COMMUNITY
Start: 2021-07-29 | End: 2024-05-17

## 2024-05-17 RX ORDER — MOMETASONE FUROATE 1 MG/G
0.1 CREAM TOPICAL DAILY
Qty: 1 | Refills: 0 | Status: ACTIVE | COMMUNITY
Start: 2024-05-17 | End: 1900-01-01

## 2024-05-17 RX ORDER — VALACYCLOVIR 1 G/1
1 TABLET, FILM COATED ORAL
Qty: 4 | Refills: 1 | Status: DISCONTINUED | COMMUNITY
Start: 2018-05-21 | End: 2024-05-17

## 2024-05-17 NOTE — REVIEW OF SYSTEMS
[Fever] : no fever [Chills] : no chills [Discharge] : no discharge [Earache] : no earache [Chest Pain] : no chest pain [Shortness Of Breath] : no shortness of breath [Abdominal Pain] : no abdominal pain [Dysuria] : no dysuria [Itching] : Itching [Skin Rash] : skin rash [Headache] : no headache [Memory Loss] : no memory loss [Suicidal] : not suicidal

## 2024-05-17 NOTE — PHYSICAL EXAM
[No Acute Distress] : no acute distress [Well-Appearing] : well-appearing [Normal Sclera/Conjunctiva] : normal sclera/conjunctiva [Normal Outer Ear/Nose] : the outer ears and nose were normal in appearance [No JVD] : no jugular venous distention [No Respiratory Distress] : no respiratory distress  [Normal Rate] : normal rate  [Regular Rhythm] : with a regular rhythm [No Edema] : there was no peripheral edema [Soft] : abdomen soft [Normal Gait] : normal gait [Normal Affect] : the affect was normal [Alert and Oriented x3] : oriented to person, place, and time [de-identified] : red raised macules

## 2024-05-17 NOTE — HISTORY OF PRESENT ILLNESS
[Moderate] : moderate [___ Weeks ago] :  [unfilled] weeks ago [Constant] : constant [FreeTextEntry8] : Patient comes for an acute visit, reports rash around last week of asprin, initially felt as insect bite, she went to , got 1 week of steroids and mometasone cream, with not much resolution, reports itching not responsive to Claritin, or Benadryl. Reports red raised patch all over body, cause uncomfortable itching, denies fever chill.

## 2024-05-21 ENCOUNTER — RX RENEWAL (OUTPATIENT)
Age: 49
End: 2024-05-21

## 2024-05-21 RX ORDER — LISINOPRIL AND HYDROCHLOROTHIAZIDE TABLETS 10; 12.5 MG/1; MG/1
10-12.5 TABLET ORAL
Qty: 90 | Refills: 0 | Status: ACTIVE | COMMUNITY
Start: 2022-08-12 | End: 1900-01-01

## 2024-06-05 NOTE — ASU PREOP CHECKLIST - LATEX ALLERGY
[Pain Location] : pain [Worsening] : worsening [5] : a current pain level of 5/10 [Rest] : relieved by rest [de-identified] : This is a 75-year-old female with left knee pain. The pt states her pain is worsening with walking. She has 5 out of 10 stabbing pain with walking. She was seen in 2017 her pain has been intermittent and stable. She was seen by an orthopedist who gave her steroid injection and 3 series of gel injection which did not help. She complains of stiffness patient is not interested in total knee replacement she ask is there anything else that she can do. Patient has a medical history of seizure disorder on Dilantin she does have skin condition sees dermatologist every 2-month. She also has a hx of osteoporosis.  [Walking] : not worsened by walking no

## 2024-06-10 RX ORDER — BLOOD SUGAR DIAGNOSTIC
STRIP MISCELLANEOUS
Qty: 100 | Refills: 3 | Status: ACTIVE | COMMUNITY
Start: 2022-01-07 | End: 1900-01-01

## 2024-06-20 RX ORDER — NITROFURANTOIN (MONOHYDRATE/MACROCRYSTALS) 25; 75 MG/1; MG/1
100 CAPSULE ORAL
Qty: 10 | Refills: 0 | Status: ACTIVE | COMMUNITY
Start: 2021-11-16 | End: 1900-01-01

## 2024-06-26 ENCOUNTER — APPOINTMENT (OUTPATIENT)
Dept: UROGYNECOLOGY | Facility: CLINIC | Age: 49
End: 2024-06-26

## 2024-07-05 ENCOUNTER — APPOINTMENT (OUTPATIENT)
Dept: UROGYNECOLOGY | Facility: CLINIC | Age: 49
End: 2024-07-05
Payer: COMMERCIAL

## 2024-07-05 LAB
BILIRUB UR QL STRIP: NEGATIVE
CLARITY UR: CLEAR
COLLECTION METHOD: NORMAL
GLUCOSE UR-MCNC: NEGATIVE
HCG UR QL: 0.2 EU/DL
HGB UR QL STRIP.AUTO: NORMAL
KETONES UR-MCNC: NEGATIVE
LEUKOCYTE ESTERASE UR QL STRIP: NEGATIVE
NITRITE UR QL STRIP: NEGATIVE
PH UR STRIP: 7
PROT UR STRIP-MCNC: NEGATIVE
SP GR UR STRIP: 1.02

## 2024-07-05 PROCEDURE — 52287 CYSTOSCOPY CHEMODENERVATION: CPT

## 2024-07-05 PROCEDURE — 81003 URINALYSIS AUTO W/O SCOPE: CPT | Mod: QW

## 2024-07-22 ENCOUNTER — APPOINTMENT (OUTPATIENT)
Dept: UROGYNECOLOGY | Facility: CLINIC | Age: 49
End: 2024-07-22
Payer: COMMERCIAL

## 2024-07-22 VITALS — DIASTOLIC BLOOD PRESSURE: 74 MMHG | SYSTOLIC BLOOD PRESSURE: 122 MMHG | HEART RATE: 59 BPM

## 2024-07-22 DIAGNOSIS — R39.15 URGENCY OF URINATION: ICD-10-CM

## 2024-07-22 DIAGNOSIS — R32 UNSPECIFIED URINARY INCONTINENCE: ICD-10-CM

## 2024-07-22 PROCEDURE — 99212 OFFICE O/P EST SF 10 MIN: CPT

## 2024-07-22 PROCEDURE — 51798 US URINE CAPACITY MEASURE: CPT

## 2024-07-22 NOTE — HISTORY OF PRESENT ILLNESS
[FreeTextEntry1] : Bekah presents to the office today s/p 100 units Intradetrusor Botox on 07/05/24. She reports about 80% improvement in her symptoms and is happy with this. Significantly less urgency, frequency and UUI. She denies any subjective feelings of incomplete emptying and denies any dysuria.  PVR normal today. This is her 7th round of botox injection and she states she usually has symptom improvement for about 4-6 months. Pt states is considering SNM and would like to discuss further today.

## 2024-07-22 NOTE — DISCUSSION/SUMMARY
[FreeTextEntry1] : Pt is happy with Botox therapy but is considering proceeding with SNM. Reviewed SNM with pt. [facility] handout given to pt. Pt states would like some time to review handout and will schedule repeat Botox injection, in the meantime, 6 months from now and will call to postpone if symptoms are well controlled. Pt states would still like to speak to surgical coordinator re possible scheduling of SNM. Surgical coordinator will reach out to pt. Pt to follow up as needed. Instructed to call if she develops symptoms of UTI or with any questions or concerns and she verbalizes understanding.

## 2024-08-26 ENCOUNTER — RX RENEWAL (OUTPATIENT)
Age: 49
End: 2024-08-26

## 2024-08-27 NOTE — H&P PST ADULT - FUNCTIONAL SCREEN CURRENT LEVEL: AMBULATION, MLM
89 year old female with PMH of myelodysplastic syndrome not on active chemo, right corneal transplant, HTN, HLD presents with fever. She has had right flank pain for the last couple of weeks that is improved with icyhot cream, otherwise was in normal state of health until this AM when she felt weak and was noted to be febrile to 100.4 F. She has chronic constipation, has not had a BM in 3 days but this is not unusual for her. She is on prophylactic augmentin/cipro for recurrent UTIs. No vomiting/diarrhea, 0 = independent

## 2024-09-11 ENCOUNTER — APPOINTMENT (OUTPATIENT)
Dept: FAMILY MEDICINE | Facility: CLINIC | Age: 49
End: 2024-09-11
Payer: COMMERCIAL

## 2024-09-11 ENCOUNTER — LABORATORY RESULT (OUTPATIENT)
Age: 49
End: 2024-09-11

## 2024-09-11 ENCOUNTER — RX RENEWAL (OUTPATIENT)
Age: 49
End: 2024-09-11

## 2024-09-11 VITALS
DIASTOLIC BLOOD PRESSURE: 78 MMHG | HEIGHT: 63 IN | HEART RATE: 58 BPM | WEIGHT: 157 LBS | OXYGEN SATURATION: 98 % | SYSTOLIC BLOOD PRESSURE: 124 MMHG | RESPIRATION RATE: 14 BRPM | BODY MASS INDEX: 27.82 KG/M2

## 2024-09-11 DIAGNOSIS — Z12.39 ENCOUNTER FOR OTHER SCREENING FOR MALIGNANT NEOPLASM OF BREAST: ICD-10-CM

## 2024-09-11 DIAGNOSIS — Z76.89 PERSONS ENCOUNTERING HEALTH SERVICES IN OTHER SPECIFIED CIRCUMSTANCES: ICD-10-CM

## 2024-09-11 DIAGNOSIS — J30.2 OTHER SEASONAL ALLERGIC RHINITIS: ICD-10-CM

## 2024-09-11 DIAGNOSIS — Z82.49 FAMILY HISTORY OF ISCHEMIC HEART DISEASE AND OTHER DISEASES OF THE CIRCULATORY SYSTEM: ICD-10-CM

## 2024-09-11 DIAGNOSIS — Z00.00 ENCOUNTER FOR GENERAL ADULT MEDICAL EXAMINATION W/OUT ABNORMAL FINDINGS: ICD-10-CM

## 2024-09-11 PROCEDURE — 99386 PREV VISIT NEW AGE 40-64: CPT

## 2024-09-11 PROCEDURE — 36415 COLL VENOUS BLD VENIPUNCTURE: CPT

## 2024-09-11 RX ORDER — FLUTICASONE PROPIONATE 50 UG/1
50 SPRAY, METERED NASAL DAILY
Qty: 48 | Refills: 0 | Status: ACTIVE | COMMUNITY
Start: 2024-09-11 | End: 1900-01-01

## 2024-09-11 NOTE — ASSESSMENT
[FreeTextEntry1] : 50 y/o HF from [facility], with hx Thyroid Cancer in 2012 and Breast Cancer in 2020, here to establish PC;  HCM: -Blood and UA today -Mammo: at [facility]  -Gyn: 10/2023 -Colonoscopy: 2023 -DEXA: 2024 -Immunizations: REFUSED  # DCIS left breast dx 2020 -Post Lumpectomy and Lyph node resection -On Anastrozole -F/up at [facility]  # Hx Thyroid Cancer 2012 post Thyroidectomy -On Levothyroxine  # HTN: -On Lisinopril-HCTZ  # Seasonal Allergies: -Allergist referral -Start Fluticasone  # Past hx DM  Further recommendations with lab results

## 2024-09-11 NOTE — HEALTH RISK ASSESSMENT
[Good] : ~his/her~  mood as  good [No] : In the past 12 months have you used drugs other than those required for medical reasons? No [No falls in past year] : Patient reported no falls in the past year [0] : 2) Feeling down, depressed, or hopeless: Not at all (0) [PHQ-2 Negative - No further assessment needed] : PHQ-2 Negative - No further assessment needed [Patient reported mammogram was normal] : Patient reported mammogram was normal [Patient reported PAP Smear was normal] : Patient reported PAP Smear was normal [Patient reported bone density results were abnormal] : Patient reported bone density results were abnormal [Patient reported colonoscopy was normal] : Patient reported colonoscopy was normal [HIV test declined] : HIV test declined [Hepatitis C test declined] : Hepatitis C test declined [None] : None [Alone] : lives alone [Employed] : employed [] :  [# Of Children ___] : has [unfilled] children [Feels Safe at Home] : Feels safe at home [Fully functional (bathing, dressing, toileting, transferring, walking, feeding)] : Fully functional (bathing, dressing, toileting, transferring, walking, feeding) [Fully functional (using the telephone, shopping, preparing meals, housekeeping, doing laundry, using] : Fully functional and needs no help or supervision to perform IADLs (using the telephone, shopping, preparing meals, housekeeping, doing laundry, using transportation, managing medications and managing finances) [Smoke Detector] : smoke detector [Safety elements used in home] : safety elements used in home [Seat Belt] :  uses seat belt [Patient/Caregiver not ready to engage] : , patient/caregiver not ready to engage [Never] : Never [NO] : No [de-identified] : regular exercise 3/week [de-identified] : healthy [[id number]] : 0 [Sexually Active] : not sexually active [Reports changes in hearing] : Reports no changes in hearing [Reports changes in vision] : Reports no changes in vision [Reports changes in dental health] : Reports no changes in dental health [TB Exposure] : is not being exposed to tuberculosis [MammogramDate] : 11/23 [MammogramComments] : at [facility] [PapSmearDate] : 02/23 [BoneDensityDate] : 2024 [BoneDensityComments] : Osteopenia [ColonoscopyDate] : 2023 [ColonoscopyComments] : 2023 [FreeTextEntry2] : self employed [employer] [AdvancecareDate] : 09/24

## 2024-09-11 NOTE — HISTORY OF PRESENT ILLNESS
[FreeTextEntry1] : Establish PCP [de-identified] : 48 y/o HF originally from [place], here to establish PCP and CPE Patient with history of hypertension, Thyroid cancer 2012>  hypothyroidism aquired (total  Thyroidectomy), DCIS Dx 2020 with Lumpectomy and lymph nodes resection in left side.,Now on Anstrazole. She f/up at [facility]. Past hx  diabetes mellitus type 2, vitamin D deficiency. , lost  due to COVID. Self employed.

## 2024-09-12 LAB
ALBUMIN SERPL ELPH-MCNC: 4.9 G/DL
ALP BLD-CCNC: 80 U/L
ALT SERPL-CCNC: 11 U/L
ANION GAP SERPL CALC-SCNC: 13 MMOL/L
APPEARANCE: CLEAR
AST SERPL-CCNC: 26 U/L
BASOPHILS # BLD AUTO: 0.02 K/UL
BASOPHILS NFR BLD AUTO: 0.4 %
BILIRUB SERPL-MCNC: 0.2 MG/DL
BILIRUBIN URINE: NEGATIVE
BLOOD URINE: NEGATIVE
BUN SERPL-MCNC: 26 MG/DL
CALCIUM SERPL-MCNC: 9.8 MG/DL
CHLORIDE SERPL-SCNC: 102 MMOL/L
CHOLEST SERPL-MCNC: 224 MG/DL
CO2 SERPL-SCNC: 26 MMOL/L
COLOR: YELLOW
CREAT SERPL-MCNC: 1.08 MG/DL
EGFR: 62 ML/MIN/1.73M2
EOSINOPHIL # BLD AUTO: 0.1 K/UL
EOSINOPHIL NFR BLD AUTO: 1.8 %
ESTIMATED AVERAGE GLUCOSE: 126 MG/DL
GLUCOSE QUALITATIVE U: NEGATIVE MG/DL
GLUCOSE SERPL-MCNC: 103 MG/DL
HBA1C MFR BLD HPLC: 6 %
HCT VFR BLD CALC: 38.3 %
HDLC SERPL-MCNC: 65 MG/DL
HGB BLD-MCNC: 12.2 G/DL
IMM GRANULOCYTES NFR BLD AUTO: 0.4 %
KETONES URINE: NEGATIVE MG/DL
LDLC SERPL CALC-MCNC: 135 MG/DL
LEUKOCYTE ESTERASE URINE: ABNORMAL
LYMPHOCYTES # BLD AUTO: 2.04 K/UL
LYMPHOCYTES NFR BLD AUTO: 36.6 %
MAN DIFF?: NORMAL
MCHC RBC-ENTMCNC: 29.2 PG
MCHC RBC-ENTMCNC: 31.9 GM/DL
MCV RBC AUTO: 91.6 FL
MONOCYTES # BLD AUTO: 0.47 K/UL
MONOCYTES NFR BLD AUTO: 8.4 %
NEUTROPHILS # BLD AUTO: 2.93 K/UL
NEUTROPHILS NFR BLD AUTO: 52.4 %
NITRITE URINE: NEGATIVE
NONHDLC SERPL-MCNC: 159 MG/DL
PH URINE: 5.5
PLATELET # BLD AUTO: 264 K/UL
POTASSIUM SERPL-SCNC: 4.4 MMOL/L
PROT SERPL-MCNC: 7.5 G/DL
PROTEIN URINE: NEGATIVE MG/DL
RBC # BLD: 4.18 M/UL
RBC # FLD: 12.5 %
SODIUM SERPL-SCNC: 141 MMOL/L
SPECIFIC GRAVITY URINE: 1.02
T3FREE SERPL-MCNC: 2.29 PG/ML
T4 FREE SERPL-MCNC: 1.6 NG/DL
TRIGL SERPL-MCNC: 133 MG/DL
TSH SERPL-ACNC: 0.67 UIU/ML
UROBILINOGEN URINE: 0.2 MG/DL
WBC # FLD AUTO: 5.58 K/UL

## 2024-10-02 ENCOUNTER — APPOINTMENT (OUTPATIENT)
Dept: UROGYNECOLOGY | Facility: CLINIC | Age: 49
End: 2024-10-02

## 2024-10-02 VITALS — HEART RATE: 67 BPM | SYSTOLIC BLOOD PRESSURE: 121 MMHG | DIASTOLIC BLOOD PRESSURE: 74 MMHG

## 2024-10-02 DIAGNOSIS — R39.15 URGENCY OF URINATION: ICD-10-CM

## 2024-10-02 DIAGNOSIS — R30.0 DYSURIA: ICD-10-CM

## 2024-10-02 LAB
BILIRUB UR QL STRIP: NEGATIVE
CLARITY UR: CLEAR
COLLECTION METHOD: NORMAL
GLUCOSE UR-MCNC: NEGATIVE
HCG UR QL: 0.2 EU/DL
HGB UR QL STRIP.AUTO: NORMAL
KETONES UR-MCNC: NEGATIVE
LEUKOCYTE ESTERASE UR QL STRIP: NORMAL
NITRITE UR QL STRIP: POSITIVE
PH UR STRIP: 6
PROT UR STRIP-MCNC: NEGATIVE
SP GR UR STRIP: 1.01

## 2024-10-02 PROCEDURE — 99214 OFFICE O/P EST MOD 30 MIN: CPT | Mod: 25

## 2024-10-02 PROCEDURE — 81003 URINALYSIS AUTO W/O SCOPE: CPT | Mod: QW

## 2024-10-02 PROCEDURE — 51701 INSERT BLADDER CATHETER: CPT | Mod: 59

## 2024-10-02 RX ORDER — NITROFURANTOIN (MONOHYDRATE/MACROCRYSTALS) 25; 75 MG/1; MG/1
100 CAPSULE ORAL TWICE DAILY
Qty: 14 | Refills: 0 | Status: ACTIVE | COMMUNITY
Start: 2024-10-02 | End: 1900-01-01

## 2024-10-02 NOTE — DISCUSSION/SUMMARY
[FreeTextEntry1] : Reviewed in office dip showed nitrates and with her symptoms can be a possible UTI. Will be sending urine for cath UA and urine culture for further evaluation; will follow up with results and discussed treating empirically vs. treatment pending final culture results. She would like to be treated today and aware pending final culture results Macrobid may need to be changed.  All questions answered.

## 2024-10-02 NOTE — HISTORY OF PRESENT ILLNESS
[FreeTextEntry1] : Lynn presents today for urgent visit. Reports she had noticed having urinary frequency and urgency and thinks she has another UTI. Notes she also felt some discomfort last week but this week burning is starting to worsen and feels some lower back pain today as well. Denies any fever, chills, n/v, hematuria.

## 2024-10-03 LAB
APPEARANCE: CLEAR
BACTERIA: ABNORMAL /HPF
BILIRUBIN URINE: NEGATIVE
BLOOD URINE: ABNORMAL
CAST: NORMAL /LPF
COLOR: YELLOW
EPITHELIAL CELLS: 0 /HPF
GLUCOSE QUALITATIVE U: NEGATIVE MG/DL
KETONES URINE: NEGATIVE MG/DL
LEUKOCYTE ESTERASE URINE: ABNORMAL
MICROSCOPIC-UA: NORMAL
NITRITE URINE: POSITIVE
PH URINE: 6
PROTEIN URINE: NEGATIVE MG/DL
RED BLOOD CELLS URINE: 1 /HPF
REVIEW: NORMAL
SPECIFIC GRAVITY URINE: 1.02
UROBILINOGEN URINE: 0.2 MG/DL
WHITE BLOOD CELLS URINE: 42 /HPF

## 2024-10-08 RX ORDER — CEPHALEXIN 500 MG/1
500 CAPSULE ORAL
Qty: 14 | Refills: 0 | Status: ACTIVE | COMMUNITY
Start: 2024-10-08 | End: 1900-01-01

## 2024-12-22 ENCOUNTER — NON-APPOINTMENT (OUTPATIENT)
Age: 49
End: 2024-12-22

## 2024-12-28 ENCOUNTER — NON-APPOINTMENT (OUTPATIENT)
Age: 49
End: 2024-12-28

## 2025-01-17 RX ORDER — NITROFURANTOIN (MONOHYDRATE/MACROCRYSTALS) 25; 75 MG/1; MG/1
100 CAPSULE ORAL
Qty: 10 | Refills: 0 | Status: ACTIVE | COMMUNITY
Start: 2025-01-17 | End: 1900-01-01

## 2025-01-24 ENCOUNTER — APPOINTMENT (OUTPATIENT)
Dept: UROGYNECOLOGY | Facility: CLINIC | Age: 50
End: 2025-01-24
Payer: COMMERCIAL

## 2025-01-24 VITALS
HEART RATE: 70 BPM | BODY MASS INDEX: 27.82 KG/M2 | WEIGHT: 157 LBS | DIASTOLIC BLOOD PRESSURE: 76 MMHG | SYSTOLIC BLOOD PRESSURE: 117 MMHG | HEIGHT: 63 IN

## 2025-01-24 LAB
BILIRUB UR QL STRIP: NEGATIVE
CLARITY UR: CLEAR
COLLECTION METHOD: NORMAL
GLUCOSE UR-MCNC: NEGATIVE
HCG UR QL: 0.2 EU/DL
HGB UR QL STRIP.AUTO: NORMAL
KETONES UR-MCNC: NEGATIVE
LEUKOCYTE ESTERASE UR QL STRIP: NEGATIVE
NITRITE UR QL STRIP: NEGATIVE
PH UR STRIP: 5.5
PROT UR STRIP-MCNC: NEGATIVE
SP GR UR STRIP: 1.02

## 2025-01-24 PROCEDURE — 52287 CYSTOSCOPY CHEMODENERVATION: CPT

## 2025-01-24 PROCEDURE — 81003 URINALYSIS AUTO W/O SCOPE: CPT | Mod: QW

## 2025-02-05 ENCOUNTER — APPOINTMENT (OUTPATIENT)
Dept: UROGYNECOLOGY | Facility: CLINIC | Age: 50
End: 2025-02-05

## 2025-02-05 VITALS
WEIGHT: 160 LBS | HEART RATE: 59 BPM | RESPIRATION RATE: 14 BRPM | SYSTOLIC BLOOD PRESSURE: 156 MMHG | HEIGHT: 63 IN | DIASTOLIC BLOOD PRESSURE: 90 MMHG | BODY MASS INDEX: 28.35 KG/M2

## 2025-02-05 DIAGNOSIS — R39.15 URGENCY OF URINATION: ICD-10-CM

## 2025-02-05 DIAGNOSIS — R32 UNSPECIFIED URINARY INCONTINENCE: ICD-10-CM

## 2025-02-05 PROCEDURE — 51798 US URINE CAPACITY MEASURE: CPT

## 2025-02-05 PROCEDURE — 99213 OFFICE O/P EST LOW 20 MIN: CPT

## 2025-02-17 ENCOUNTER — APPOINTMENT (OUTPATIENT)
Dept: FAMILY MEDICINE | Facility: CLINIC | Age: 50
End: 2025-02-17
Payer: COMMERCIAL

## 2025-02-17 VITALS
SYSTOLIC BLOOD PRESSURE: 120 MMHG | WEIGHT: 157 LBS | TEMPERATURE: 98.2 F | HEIGHT: 63 IN | OXYGEN SATURATION: 97 % | RESPIRATION RATE: 15 BRPM | HEART RATE: 61 BPM | BODY MASS INDEX: 27.82 KG/M2 | DIASTOLIC BLOOD PRESSURE: 74 MMHG

## 2025-02-17 DIAGNOSIS — C73 MALIGNANT NEOPLASM OF THYROID GLAND: ICD-10-CM

## 2025-02-17 DIAGNOSIS — R73.03 PREDIABETES.: ICD-10-CM

## 2025-02-17 DIAGNOSIS — D05.10 INTRADUCTAL CARCINOMA IN SITU OF UNSPECIFIED BREAST: ICD-10-CM

## 2025-02-17 DIAGNOSIS — I10 ESSENTIAL (PRIMARY) HYPERTENSION: ICD-10-CM

## 2025-02-17 PROCEDURE — G2211 COMPLEX E/M VISIT ADD ON: CPT | Mod: NC

## 2025-02-17 PROCEDURE — 99214 OFFICE O/P EST MOD 30 MIN: CPT

## 2025-02-17 PROCEDURE — 36415 COLL VENOUS BLD VENIPUNCTURE: CPT

## 2025-02-18 LAB
ESTIMATED AVERAGE GLUCOSE: 140 MG/DL
HBA1C MFR BLD HPLC: 6.5 %

## 2025-02-21 ENCOUNTER — NON-APPOINTMENT (OUTPATIENT)
Age: 50
End: 2025-02-21

## 2025-07-18 ENCOUNTER — NON-APPOINTMENT (OUTPATIENT)
Age: 50
End: 2025-07-18

## 2025-07-18 RX ORDER — NITROFURANTOIN (MONOHYDRATE/MACROCRYSTALS) 25; 75 MG/1; MG/1
100 CAPSULE ORAL TWICE DAILY
Qty: 10 | Refills: 0 | Status: ACTIVE | COMMUNITY
Start: 2025-07-18 | End: 1900-01-01

## 2025-07-25 ENCOUNTER — APPOINTMENT (OUTPATIENT)
Dept: UROGYNECOLOGY | Facility: CLINIC | Age: 50
End: 2025-07-25
Payer: COMMERCIAL

## 2025-07-25 VITALS
SYSTOLIC BLOOD PRESSURE: 109 MMHG | HEART RATE: 68 BPM | BODY MASS INDEX: 30.36 KG/M2 | WEIGHT: 165 LBS | DIASTOLIC BLOOD PRESSURE: 72 MMHG | HEIGHT: 62 IN

## 2025-07-25 PROCEDURE — 81003 URINALYSIS AUTO W/O SCOPE: CPT | Mod: QW

## 2025-07-25 PROCEDURE — 52287 CYSTOSCOPY CHEMODENERVATION: CPT

## 2025-08-13 ENCOUNTER — APPOINTMENT (OUTPATIENT)
Dept: UROGYNECOLOGY | Facility: CLINIC | Age: 50
End: 2025-08-13

## 2025-08-28 ENCOUNTER — APPOINTMENT (OUTPATIENT)
Dept: FAMILY MEDICINE | Facility: CLINIC | Age: 50
End: 2025-08-28
Payer: COMMERCIAL

## 2025-08-28 VITALS
DIASTOLIC BLOOD PRESSURE: 72 MMHG | BODY MASS INDEX: 30.36 KG/M2 | OXYGEN SATURATION: 98 % | WEIGHT: 165 LBS | SYSTOLIC BLOOD PRESSURE: 118 MMHG | HEIGHT: 62 IN | HEART RATE: 80 BPM | RESPIRATION RATE: 14 BRPM | TEMPERATURE: 97.2 F

## 2025-08-28 DIAGNOSIS — I10 ESSENTIAL (PRIMARY) HYPERTENSION: ICD-10-CM

## 2025-08-28 DIAGNOSIS — R73.03 PREDIABETES.: ICD-10-CM

## 2025-08-28 DIAGNOSIS — E03.9 HYPOTHYROIDISM, UNSPECIFIED: ICD-10-CM

## 2025-08-28 PROCEDURE — G2211 COMPLEX E/M VISIT ADD ON: CPT | Mod: NC

## 2025-08-28 PROCEDURE — 99214 OFFICE O/P EST MOD 30 MIN: CPT

## 2025-08-28 PROCEDURE — 36415 COLL VENOUS BLD VENIPUNCTURE: CPT

## 2025-08-29 ENCOUNTER — NON-APPOINTMENT (OUTPATIENT)
Age: 50
End: 2025-08-29

## 2025-08-29 LAB
ANION GAP SERPL CALC-SCNC: 16 MMOL/L
BUN SERPL-MCNC: 25 MG/DL
CALCIUM SERPL-MCNC: 9.5 MG/DL
CHLORIDE SERPL-SCNC: 105 MMOL/L
CO2 SERPL-SCNC: 22 MMOL/L
CREAT SERPL-MCNC: 0.89 MG/DL
EGFRCR SERPLBLD CKD-EPI 2021: 78 ML/MIN/1.73M2
ESTIMATED AVERAGE GLUCOSE: 134 MG/DL
GLUCOSE SERPL-MCNC: 104 MG/DL
HBA1C MFR BLD HPLC: 6.3 %
POTASSIUM SERPL-SCNC: 4.2 MMOL/L
SODIUM SERPL-SCNC: 144 MMOL/L
TSH SERPL-ACNC: 0.65 UIU/ML